# Patient Record
Sex: MALE | Race: WHITE | NOT HISPANIC OR LATINO | Employment: FULL TIME | URBAN - METROPOLITAN AREA
[De-identification: names, ages, dates, MRNs, and addresses within clinical notes are randomized per-mention and may not be internally consistent; named-entity substitution may affect disease eponyms.]

---

## 2021-11-22 ENCOUNTER — APPOINTMENT (OUTPATIENT)
Dept: URGENT CARE | Age: 56
End: 2021-11-22
Payer: OTHER MISCELLANEOUS

## 2021-11-22 PROCEDURE — G0382 LEV 3 HOSP TYPE B ED VISIT: HCPCS | Performed by: FAMILY MEDICINE

## 2021-11-22 PROCEDURE — 99283 EMERGENCY DEPT VISIT LOW MDM: CPT | Performed by: FAMILY MEDICINE

## 2021-11-24 ENCOUNTER — TELEPHONE (OUTPATIENT)
Dept: OTHER | Facility: OTHER | Age: 56
End: 2021-11-24

## 2021-11-24 ENCOUNTER — APPOINTMENT (EMERGENCY)
Dept: RADIOLOGY | Facility: HOSPITAL | Age: 56
End: 2021-11-24
Payer: OTHER MISCELLANEOUS

## 2021-11-24 ENCOUNTER — HOSPITAL ENCOUNTER (EMERGENCY)
Facility: HOSPITAL | Age: 56
Discharge: HOME | End: 2021-11-24
Attending: EMERGENCY MEDICINE | Admitting: EMERGENCY MEDICINE
Payer: OTHER MISCELLANEOUS

## 2021-11-24 VITALS
TEMPERATURE: 97.8 F | HEART RATE: 58 BPM | RESPIRATION RATE: 18 BRPM | DIASTOLIC BLOOD PRESSURE: 85 MMHG | WEIGHT: 162.48 LBS | SYSTOLIC BLOOD PRESSURE: 129 MMHG | OXYGEN SATURATION: 95 %

## 2021-11-24 DIAGNOSIS — M79.605 ACUTE PAIN OF LEFT LOWER EXTREMITY: Primary | ICD-10-CM

## 2021-11-24 PROCEDURE — 99283 EMERGENCY DEPT VISIT LOW MDM: CPT

## 2021-11-24 PROCEDURE — 72100 X-RAY EXAM L-S SPINE 2/3 VWS: CPT

## 2021-11-24 PROCEDURE — 73502 X-RAY EXAM HIP UNI 2-3 VIEWS: CPT

## 2021-11-24 PROCEDURE — 96372 THER/PROPH/DIAG INJ SC/IM: CPT

## 2021-11-24 PROCEDURE — 99284 EMERGENCY DEPT VISIT MOD MDM: CPT | Performed by: PHYSICIAN ASSISTANT

## 2021-11-24 RX ORDER — ACETAMINOPHEN 325 MG/1
975 TABLET ORAL ONCE
Status: COMPLETED | OUTPATIENT
Start: 2021-11-24 | End: 2021-11-24

## 2021-11-24 RX ORDER — KETOROLAC TROMETHAMINE 30 MG/ML
15 INJECTION, SOLUTION INTRAMUSCULAR; INTRAVENOUS ONCE
Status: COMPLETED | OUTPATIENT
Start: 2021-11-24 | End: 2021-11-24

## 2021-11-24 RX ORDER — NAPROXEN 500 MG/1
500 TABLET ORAL 2 TIMES DAILY PRN
Qty: 30 TABLET | Refills: 0 | Status: SHIPPED | OUTPATIENT
Start: 2021-11-24

## 2021-11-24 RX ORDER — METHOCARBAMOL 750 MG/1
750 TABLET, FILM COATED ORAL 2 TIMES DAILY PRN
Qty: 20 TABLET | Refills: 0 | Status: SHIPPED | OUTPATIENT
Start: 2021-11-24 | End: 2022-06-24 | Stop reason: SDUPTHER

## 2021-11-24 RX ADMIN — KETOROLAC TROMETHAMINE 15 MG: 30 INJECTION, SOLUTION INTRAMUSCULAR; INTRAVENOUS at 10:26

## 2021-11-24 RX ADMIN — ACETAMINOPHEN 975 MG: 325 TABLET, FILM COATED ORAL at 10:26

## 2021-11-29 ENCOUNTER — APPOINTMENT (OUTPATIENT)
Dept: URGENT CARE | Age: 56
End: 2021-11-29
Payer: OTHER MISCELLANEOUS

## 2021-11-29 PROCEDURE — 99213 OFFICE O/P EST LOW 20 MIN: CPT | Performed by: FAMILY MEDICINE

## 2021-12-13 ENCOUNTER — APPOINTMENT (OUTPATIENT)
Dept: URGENT CARE | Age: 56
End: 2021-12-13
Payer: OTHER MISCELLANEOUS

## 2021-12-13 PROCEDURE — 99213 OFFICE O/P EST LOW 20 MIN: CPT | Performed by: FAMILY MEDICINE

## 2021-12-30 ENCOUNTER — APPOINTMENT (OUTPATIENT)
Dept: URGENT CARE | Age: 56
End: 2021-12-30

## 2022-01-20 ENCOUNTER — APPOINTMENT (OUTPATIENT)
Dept: URGENT CARE | Age: 57
End: 2022-01-20
Payer: OTHER MISCELLANEOUS

## 2022-01-20 PROCEDURE — 99213 OFFICE O/P EST LOW 20 MIN: CPT

## 2022-01-21 ENCOUNTER — TELEPHONE (OUTPATIENT)
Dept: PAIN MEDICINE | Facility: MEDICAL CENTER | Age: 57
End: 2022-01-21

## 2022-02-03 ENCOUNTER — APPOINTMENT (OUTPATIENT)
Dept: URGENT CARE | Age: 57
End: 2022-02-03
Payer: OTHER MISCELLANEOUS

## 2022-02-03 PROCEDURE — 99213 OFFICE O/P EST LOW 20 MIN: CPT | Performed by: FAMILY MEDICINE

## 2022-02-24 ENCOUNTER — APPOINTMENT (OUTPATIENT)
Dept: URGENT CARE | Age: 57
End: 2022-02-24
Payer: OTHER MISCELLANEOUS

## 2022-02-24 PROCEDURE — 99213 OFFICE O/P EST LOW 20 MIN: CPT | Performed by: FAMILY MEDICINE

## 2022-03-03 ENCOUNTER — APPOINTMENT (OUTPATIENT)
Dept: URGENT CARE | Age: 57
End: 2022-03-03
Payer: OTHER MISCELLANEOUS

## 2022-03-03 PROCEDURE — 99213 OFFICE O/P EST LOW 20 MIN: CPT | Performed by: FAMILY MEDICINE

## 2022-03-16 ENCOUNTER — TELEPHONE (OUTPATIENT)
Dept: PAIN MEDICINE | Facility: MEDICAL CENTER | Age: 57
End: 2022-03-16

## 2022-03-16 NOTE — TELEPHONE ENCOUNTER
Reached out to the patient about rescheduling his appointment he missed on 2/28 with Dr Courtney Somers  He stated at this time he does not wish to reschedule

## 2022-06-08 ENCOUNTER — APPOINTMENT (EMERGENCY)
Dept: RADIOLOGY | Facility: HOSPITAL | Age: 57
End: 2022-06-08

## 2022-06-08 ENCOUNTER — HOSPITAL ENCOUNTER (EMERGENCY)
Facility: HOSPITAL | Age: 57
Discharge: HOME | End: 2022-06-08
Attending: EMERGENCY MEDICINE
Payer: COMMERCIAL

## 2022-06-08 VITALS
HEART RATE: 62 BPM | RESPIRATION RATE: 18 BRPM | SYSTOLIC BLOOD PRESSURE: 157 MMHG | OXYGEN SATURATION: 99 % | TEMPERATURE: 97.5 F | WEIGHT: 170.86 LBS | DIASTOLIC BLOOD PRESSURE: 94 MMHG
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
## 2022-06-08 DIAGNOSIS — S22.39XA RIB FRACTURE: ICD-10-CM

## 2022-06-08 PROCEDURE — 71101 X-RAY EXAM UNILAT RIBS/CHEST: CPT

## 2022-06-08 PROCEDURE — 99283 EMERGENCY DEPT VISIT LOW MDM: CPT

## 2022-06-08 PROCEDURE — 99284 EMERGENCY DEPT VISIT MOD MDM: CPT | Performed by: PHYSICIAN ASSISTANT

## 2022-06-08 RX ORDER — METHOCARBAMOL 500 MG/1
500 TABLET, FILM COATED ORAL ONCE
Status: COMPLETED | OUTPATIENT
Start: 2022-06-08 | End: 2022-06-08

## 2022-06-08 RX ORDER — OXYCODONE HYDROCHLORIDE 5 MG/1
5 TABLET ORAL ONCE
Status: COMPLETED | OUTPATIENT
Start: 2022-06-08 | End: 2022-06-08

## 2022-06-08 RX ORDER — OXYCODONE HYDROCHLORIDE AND ACETAMINOPHEN 5; 325 MG/1; MG/1
1 TABLET ORAL EVERY 6 HOURS PRN
Qty: 20 TABLET | Refills: 0 | Status: SHIPPED | OUTPATIENT
Start: 2022-06-08 | End: 2022-06-13

## 2022-06-08 RX ORDER — METHOCARBAMOL 500 MG/1
500 TABLET, FILM COATED ORAL 2 TIMES DAILY
Qty: 20 TABLET | Refills: 0 | Status: SHIPPED | OUTPATIENT
Start: 2022-06-08

## 2022-06-08 RX ORDER — LIDOCAINE 50 MG/G
1 PATCH TOPICAL ONCE
Status: DISCONTINUED | OUTPATIENT
Start: 2022-06-08 | End: 2022-06-08 | Stop reason: HOSPADM

## 2022-06-08 RX ADMIN — OXYCODONE HYDROCHLORIDE 5 MG: 5 TABLET ORAL at 19:47

## 2022-06-08 RX ADMIN — LIDOCAINE 1 PATCH: 50 PATCH TOPICAL at 19:47

## 2022-06-08 RX ADMIN — METHOCARBAMOL 500 MG: 500 TABLET ORAL at 19:47

## 2022-06-08 NOTE — ED PROVIDER NOTES
History  Chief Complaint   Patient presents with    Fall     Patient fell on cellar steps and landed on L side  States, "I think I broke a few ribs"  States possibly hitting head  Denies blood thinners  Denies neck pain  Edna Andres is a 62 y o   male with no significant past medical history who presents to the emergency department with left-sided chest wall pain after fall  The patient states on Monday evening he was ambulating down his basement steps when he slipped causing him to fall onto his left side  He states he states he fell down approximately 4 steps  He states that he landed primarily with his left side of his chest wall  He denies any head strike, loss consciousness neck or back pain  He denies any tongue lac or incontinence  No blood thinner use  Fall was unwitnessed  He denies any prodromal symptoms including dizziness, lightheadedness, chest pain shortness a breath, tunnel vision, GI symptoms  He states since the fall he has had some left-sided chest wall pain  He states that worsens with the rest of the left arm as well as with any sitting up leaning back  Denies any abdominal pain, nausea, vomiting, diarrhea  No urinary complaints  He denies any weakness, numbness, tingling  He states he did take some Tylenol as well as Motrin earlier in the day with minimal relief  The patient denies any fevers, chills, dizziness, headaches, chest pain, shortness of breath, palpitations, abdominal pain, nausea, vomiting, diarrhea, lower extremity pain/swelling/edema  History provided by:  Patient   used: No    Fall  Mechanism of injury: fall    Injury location:  Torso  Torso injury location:  L chest  Incident location:  Home  Time since incident:  3 days  Arrived directly from scene: no    Fall:     Fall occurred:  Down stairs    Height of fall:  4 stairs     Impact surface:  Hard floor    Point of impact: torso      Entrapped after fall: no    Suspicion of alcohol use: no    Suspicion of drug use: no    Tetanus status:  Up to date  Prior to arrival data:     Bystander interventions:  None  Associated symptoms: no abdominal pain, no back pain, no blindness, no chest pain, no difficulty breathing, no headaches, no hearing loss, no loss of consciousness, no nausea, no neck pain, no seizures and no vomiting    Risk factors: no AICD, no anticoagulation therapy, no asthma, no beta blocker therapy, no CABG, no COPD, no dialysis, no kidney disease and no past MI        Prior to Admission Medications   Prescriptions Last Dose Informant Patient Reported? Taking? methocarbamol (ROBAXIN) 750 mg tablet   No No   Sig: Take 1 tablet (750 mg total) by mouth 2 (two) times a day as needed for muscle spasms   naproxen (NAPROSYN) 500 mg tablet   No No   Sig: Take 1 tablet (500 mg total) by mouth 2 (two) times a day as needed for mild pain or moderate pain      Facility-Administered Medications: None       History reviewed  No pertinent past medical history  History reviewed  No pertinent surgical history  History reviewed  No pertinent family history  I have reviewed and agree with the history as documented  E-Cigarette/Vaping     E-Cigarette/Vaping Substances     Social History     Tobacco Use    Smoking status: Current Every Day Smoker     Packs/day: 0 25    Smokeless tobacco: Never Used   Substance Use Topics    Alcohol use: Not Currently    Drug use: Not Currently       Review of Systems   Constitutional: Positive for activity change  Negative for chills, diaphoresis, fatigue and fever  HENT: Negative for congestion, hearing loss, tinnitus and voice change  Eyes: Negative for blindness  Respiratory: Negative for apnea, chest tightness, shortness of breath, wheezing and stridor  Cardiovascular: Negative for chest pain, palpitations and leg swelling     Gastrointestinal: Negative for abdominal distention, abdominal pain, anal bleeding, blood in stool, constipation, nausea and vomiting  Genitourinary: Negative for dysuria, frequency and urgency  Musculoskeletal: Negative for arthralgias, back pain, joint swelling and neck pain  Skin: Negative for color change, pallor, rash and wound  Neurological: Negative for dizziness, tremors, seizures, loss of consciousness, syncope, weakness, numbness and headaches  All other systems reviewed and are negative  Physical Exam  Physical Exam  Vitals and nursing note reviewed  Constitutional:       General: He is not in acute distress  Appearance: Normal appearance  He is normal weight  He is not ill-appearing or toxic-appearing  HENT:      Head: Normocephalic and atraumatic  Comments: No hematomas or contusions to the skull, face, back of the head  Right Ear: Tympanic membrane, ear canal and external ear normal       Left Ear: Tympanic membrane, ear canal and external ear normal       Ears:      Comments: No hemotympanum     Nose: Nose normal  No congestion or rhinorrhea  Comments: No septal hematoma     Mouth/Throat:      Mouth: Mucous membranes are moist       Pharynx: Oropharynx is clear  Comments: No blood in the oropharynx  Eyes:      Extraocular Movements: Extraocular movements intact  Pupils: Pupils are equal, round, and reactive to light  Neck:      Comments: No midline tenderness step-offs or deformities  Cardiovascular:      Rate and Rhythm: Normal rate and regular rhythm  Pulses: Normal pulses  Heart sounds: Normal heart sounds  No murmur heard  No friction rub  No gallop  Pulmonary:      Effort: Pulmonary effort is normal       Breath sounds: Normal breath sounds  Chest:      Chest wall: Tenderness present  Abdominal:      General: Abdomen is flat  There is no distension  Palpations: Abdomen is soft  There is no mass  Tenderness: There is no abdominal tenderness  Hernia: No hernia is present     Musculoskeletal:         General: Normal range of motion  Cervical back: Normal range of motion and neck supple  No tenderness  Back:       Comments: Bilateral upper extremities:  No obvious deformity/abrasions/lacerations  2+ radial Pulse/ Cap Refill <2 seconds  Shoulder: NTTP, Strength 5/5- FROM including ER/IR/Abduction/Adduction/Flexion/Extension  Elbow: NTTP, Strength 5/5- FROM including Flexion/Extension/ Pronation/supination  Wrist: NTTP, Strength 5/5- FROM including Flexion/Extension/Ulnar Deviation/Radial Deviation  Hand: NTTP: Strength 5/5- FROM at all fingers including flexion, extension, abduction, adduction, and opposition of the thumb  FDS/FDP tendons intact by exam, Extensor tendons intact by exam    Radial/Ulnar/ Median/ and Axillary sensation intact  Bilateral lower extremities:  No obvious deformity/abrasions/lacerations  2+ femoral/DP/PT/ Cap Refill <2 seconds  Hip: NTTP, Strength 5/5- FROM including Abduction/Adduction/Flexion/Extension  Knee: NTTP, Strength 5/5- FROM including Flexion/Extension- no apparent laxity  Ankle: NTTP, Strength 5/5- FROM including Flexion/Extension/Inversion/Eversion  Foot : NTTP: Strength 5/5- FROM at all toes including flexion, extension, abduction, adduction  Sensation intact over all joints and foot  Skin:     General: Skin is warm  Capillary Refill: Capillary refill takes less than 2 seconds  Neurological:      General: No focal deficit present  Mental Status: He is alert and oriented to person, place, and time  Mental status is at baseline  Comments: GCS 15  CN 2-12 intact  PERRLA  Bilateral upper and lower extremities have 5/5 strength and sensation is intact  Finger to Nose and Heel to shin are intact   Speech normal   Gait intact           Vital Signs  ED Triage Vitals   Temperature Pulse Respirations Blood Pressure SpO2   06/08/22 1846 06/08/22 1845 06/08/22 1845 06/08/22 1845 06/08/22 1845   97 5 °F (36 4 °C) 62 18 157/94 99 %      Temp Source Heart Rate Source Patient Position - Orthostatic VS BP Location FiO2 (%)   06/08/22 1846 -- 06/08/22 1845 06/08/22 1845 --   Oral  Sitting Right arm       Pain Score       06/08/22 1845       8           Vitals:    06/08/22 1845   BP: 157/94   Pulse: 62   Patient Position - Orthostatic VS: Sitting         Visual Acuity      ED Medications  Medications   oxyCODONE (ROXICODONE) IR tablet 5 mg (5 mg Oral Given 6/8/22 1947)   methocarbamol (ROBAXIN) tablet 500 mg (500 mg Oral Given 6/8/22 1947)       Diagnostic Studies  Results Reviewed     None                 XR ribs with pa chest min 3 views LEFT   ED Interpretation by Josh Kingsley PA-C (06/08 2009)   ? 4th rib fracture, otherwise no Subq air  No PHX  Procedures  Procedures         ED Course                                             MDM  Number of Diagnoses or Management Options  Fall, initial encounter: new and requires workup  Rib fracture: new and requires workup  Diagnosis management comments: Patient was seen and examined  in the emergency department for chief complaint of left-sided chest wall pain  The patient presented after fall down approximately 4 stairs  No loss conscious no head neck or back pain  Neuro exam is nonfocal and patient denies any numbness weakness or paresthesias  Lungs are clear  Abdomen soft nontender nondistended  No bruising to the chest wall thorax or abdomen  Bilateral upper and lower extremities are neurovascular intact without evidence of trauma  Patient has tenderness palpation left side chest wall axillary line and posterior as well as anteriorly         Initial differential includes but is not limited to:  Sprain, strain, fracture, contusion, rib fracture  At this point do not suspect intracranial injury:  Patient able to be cleared via [address] rules:  1 ) GCS 15 within 2 hours of injury  2 ) There is no open or depressed skull fracture on physical exam   3 ) No signs of basilar skull fracture    4 ) There was not more than 1 episode of vomiting   5 ) There is no retrograde amnesia to greater than 30 minutes prior to the event  6 ) No dangerous mechanism (fall greater than 3 feet or struck as pedetrian)  7 ) Patient is less than 72years old and older than 16   8 ) Patient is not on anticoagulants  Per Nexus Criteria, patient does not criteria for imaging of c-spine  Patient does not:  Have a focal neurologic deficit  Midline c-spine tenderness  Altered LOC  Meet clinical criteria for intoxication or admit to ETOH use  Have a distracting injury  Workup: Will evaluate for fracture on x-ray  Will treat clinically is rib fracture  Oxycodone, Robaxin, lidocaine patch  Incentive spirometry given  Instructions given  Disposition:  General impression 80-year-old male with left-sided rib fracture  Treat symptomatically  Follow-up PCP,  strict return precautions  Incentive spirometry instructions given  We discussed the goal of short term duration of narcotic therapy, given abuse and addiction potential   Patient wishes to proceed with script for narcotic   Patient was screened to identify increased risks of harm  Viri Durant is no history of substance use disorder, concurrent use of benzodiazepines, or comorbidities that increased risk for respiratory compromise   A query of the PDMP was performed, finding no concomitant benzodiazepine prescriptions or prior opioid prescriptions   Opioid safety measures were discussed  The patient was discharged in stable condition  Patient ambulated off the department  Extensive return to emergency department precautions were discussed  Follow up with appropriate providers including primary care physician was discussed  Patient and/or their  primary decision maker expressed understanding  Patient remained stable during entire emergency department stay           Amount and/or Complexity of Data Reviewed  Tests in the radiology section of CPT®: ordered and reviewed  Review and summarize past medical records: yes  Independent visualization of images, tracings, or specimens: yes    Risk of Complications, Morbidity, and/or Mortality  Presenting problems: moderate  Diagnostic procedures: moderate  Management options: moderate    Patient Progress  Patient progress: stable      Disposition  Final diagnoses:   Fall, initial encounter   Rib fracture     Time reflects when diagnosis was documented in both MDM as applicable and the Disposition within this note     Time User Action Codes Description Comment    6/8/2022  8:14 PM Irl Ogles Add [R93  KCYV] Fall, initial encounter     6/8/2022  8:14 PM Irl Ogles Add [S22 39XA] Rib fracture       ED Disposition     ED Disposition   Discharge    Condition   Stable    Date/Time   Wed Jun 8, 2022  8:18 PM    Comment   Carlie Judd discharge to home/self care  Follow-up Information     Follow up With Specialties Details Why [address] [facility] Emergency Department Emergency Medicine Go to  As needed, If symptoms worsen [place] [id number]  [address] [facility] Emergency Department, [address] [place] [address] 200  Call  To schedule an appointment with a primary care physician [phone]             Discharge Medication List as of 6/8/2022  8:18 PM      START taking these medications    Details   !! methocarbamol (ROBAXIN) 500 mg tablet Take 1 tablet (500 mg total) by mouth 2 (two) times a day, Starting Wed 6/8/2022, Normal      oxyCODONE-acetaminophen (Percocet) 5-325 mg per tablet Take 1 tablet by mouth every 6 (six) hours as needed for moderate pain for up to 5 days Max Daily Amount: 4 tablets, Starting Wed 6/8/2022, Until Mon 6/13/2022 at 2359, Normal       !! - Potential duplicate medications found  Please discuss with provider        CONTINUE these medications which have NOT CHANGED Details   !! methocarbamol (ROBAXIN) 750 mg tablet Take 1 tablet (750 mg total) by mouth 2 (two) times a day as needed for muscle spasms, Starting Wed 11/24/2021, Normal      naproxen (NAPROSYN) 500 mg tablet Take 1 tablet (500 mg total) by mouth 2 (two) times a day as needed for mild pain or moderate pain, Starting Wed 11/24/2021, Normal       !! - Potential duplicate medications found  Please discuss with provider  No discharge procedures on file      PDMP Review       Value Time User    PDMP Reviewed  Yes 6/8/2022  8:15 PM Yessy Acevedo PA-C          ED Provider  Electronically Signed by           Yessy Acevedo PA-C  06/09/22 [id number]

## 2022-06-08 NOTE — Clinical Note
Junior Knight was seen and treated in our emergency department on 6/8/2022  Limited lifting capacity upper extremities    Diagnosis: Rib fracture    Gely Hua  may return to work on return date  He may return on this date: 06/13/2022    May return Monday, limited lifting capacity secondary to rib fracture  May return symptomatically feeling better/cleared by primary care  If you have any questions or concerns, please don't hesitate to call        Ryan Dover PA-C    ______________________________           _______________          _______________  Hospital Representative                              Date                                Time

## 2022-06-09 NOTE — DISCHARGE INSTRUCTIONS
You were seen in the emergency department today for left-sided chest wall pain after fall  Radiologic imaging revealed left-sided rib fracture  Please follow-up with your primary care physician regarding your symptoms  Return sooner to the Emergency Department if increased pain, chest pain, shortness a breath, cough, congestion, fevers, chills, difficulty breathing, swelling, numbness, weakness, fever, redness, vomiting  Lidocaine patch every 12 hours  Robaxin and Percocet as needed for pain  Do not drive or operate machinery with percocet  Do not use acetaminophen with percocet  Incentive spirometry 10 times an hour while awake

## 2022-06-24 ENCOUNTER — OFFICE VISIT (OUTPATIENT)
Dept: FAMILY MEDICINE CLINIC | Facility: CLINIC | Age: 57
End: 2022-06-24
Payer: COMMERCIAL

## 2022-06-24 VITALS
DIASTOLIC BLOOD PRESSURE: 90 MMHG | OXYGEN SATURATION: 99 % | SYSTOLIC BLOOD PRESSURE: 122 MMHG | RESPIRATION RATE: 14 BRPM | HEIGHT: 67 IN | HEART RATE: 63 BPM | TEMPERATURE: 98.5 F | BODY MASS INDEX: 26.9 KG/M2 | WEIGHT: 171.4 LBS

## 2022-06-24 DIAGNOSIS — Z00.00 HEALTHCARE MAINTENANCE: Primary | ICD-10-CM

## 2022-06-24 DIAGNOSIS — Z12.5 PROSTATE CANCER SCREENING: ICD-10-CM

## 2022-06-24 DIAGNOSIS — R91.1 LESION OF LUNG: ICD-10-CM

## 2022-06-24 DIAGNOSIS — Z12.11 COLON CANCER SCREENING: ICD-10-CM

## 2022-06-24 DIAGNOSIS — R07.81 RIB PAIN ON LEFT SIDE: ICD-10-CM

## 2022-06-24 DIAGNOSIS — R20.2 LEFT HAND PARESTHESIA: ICD-10-CM

## 2022-06-24 PROCEDURE — 3008F BODY MASS INDEX DOCD: CPT | Performed by: NURSE PRACTITIONER

## 2022-06-24 PROCEDURE — 99386 PREV VISIT NEW AGE 40-64: CPT | Performed by: NURSE PRACTITIONER

## 2022-06-24 PROCEDURE — 3725F SCREEN DEPRESSION PERFORMED: CPT | Performed by: NURSE PRACTITIONER

## 2022-06-24 RX ORDER — METHOCARBAMOL 750 MG/1
750 TABLET, FILM COATED ORAL 2 TIMES DAILY PRN
Qty: 20 TABLET | Refills: 0 | Status: SHIPPED | OUTPATIENT
Start: 2022-06-24

## 2022-06-24 RX ORDER — OMEPRAZOLE 20 MG/1
20 TABLET, DELAYED RELEASE ORAL DAILY
COMMUNITY

## 2022-06-24 RX ORDER — PREDNISONE 50 MG/1
50 TABLET ORAL DAILY
Qty: 5 TABLET | Refills: 0 | Status: SHIPPED | OUTPATIENT
Start: 2022-06-24 | End: 2022-06-29

## 2022-06-24 NOTE — ASSESSMENT & PLAN NOTE
- s/p fall with rib fractures  - Continue Naproxen twice daily  - Refill sent for Robaxin as needed  - Will continue to follow up

## 2022-06-24 NOTE — ASSESSMENT & PLAN NOTE
- Discussed immunizations, screenings, healthy diet, exercise, and safety measures  - Discussed importance of regular dental and vision exams    - Referral placed for screening colonoscopy  - Routine blood work ordered  Will contact patient with results

## 2022-06-24 NOTE — ASSESSMENT & PLAN NOTE
- Possibly r/t to inflammation caused by overuse  Not typical presentation for carpal tunnel but will prescribe prednisone for 5 days  Advised of side effects   - Will continue to follow up

## 2022-06-24 NOTE — ASSESSMENT & PLAN NOTE
- Chest x-ray performed in ER showed incidentally noted 3-4 cm pleural-based lesion  Recommended further evaluation with chest CT  CT ordered  - Will continue to follow up

## 2022-06-24 NOTE — PROGRESS NOTES
Assessment/Plan:    Lesion of lung  - Chest x-ray performed in ER showed incidentally noted 3-4 cm pleural-based lesion  Recommended further evaluation with chest CT  CT ordered  - Will continue to follow up  Rib pain on left side  - s/p fall with rib fractures  - Continue Naproxen twice daily  - Refill sent for Robaxin as needed  - Will continue to follow up  Left hand paresthesia  - Possibly r/t to inflammation caused by overuse  Not typical presentation for carpal tunnel but will prescribe prednisone for 5 days  Advised of side effects   - Will continue to follow up  Healthcare maintenance  - Discussed immunizations, screenings, healthy diet, exercise, and safety measures  - Discussed importance of regular dental and vision exams    - Referral placed for screening colonoscopy  - Routine blood work ordered  Will contact patient with results  Diagnoses and all orders for this visit:    Healthcare maintenance  -     CBC and differential; Future  -     Comprehensive metabolic panel; Future  -     Lipid Panel with Direct LDL reflex; Future  -     TSH, 3rd generation with Free T4 reflex; Future    Left hand paresthesia  -     predniSONE 50 mg tablet; Take 1 tablet (50 mg total) by mouth daily for 5 days    Rib pain on left side  -     methocarbamol (ROBAXIN) 750 mg tablet; Take 1 tablet (750 mg total) by mouth 2 (two) times a day as needed for muscle spasms    Lesion of lung  -     CT chest wo contrast; Future    Colon cancer screening  -     Ambulatory Referral to General Surgery; Future    Prostate cancer screening  -     PSA, Total Screen; Future    Other orders  -     omeprazole (PriLOSEC OTC) 20 MG tablet; Take 20 mg by mouth daily        Subjective:      Patient ID: Miky Wren is a 62 y o  male  Patient presents today to establish care  He has no reported past medical history and takes no prescription medications on a daily basis   He was recently seen in the ER on 6/8 with chest wall pain s/p fall  He did have multiple left sided rib fractures  His x-ray did also note a possible pleural-based lesion which they recommended follow up CT  Patient is complaining today of numbness and tingling in his left ring and pinky finger  He also states that he feels like these fingers get cold  This started to happen after his injury  He denies any hand or wrist pain  He does do repetitive movements at work with [employer]  The following portions of the patient's history were reviewed and updated as appropriate: allergies, current medications, past family history, past medical history, past social history, past surgical history and problem list     Review of Systems   Constitutional: Negative for fatigue and fever  HENT: Positive for rhinorrhea  Negative for congestion and trouble swallowing  Eyes: Negative for visual disturbance  Respiratory: Negative for cough and shortness of breath  Cardiovascular: Negative for chest pain and palpitations  Gastrointestinal: Negative for abdominal pain and blood in stool  Endocrine: Negative for cold intolerance and heat intolerance  Genitourinary: Negative for difficulty urinating, dysuria and frequency  Musculoskeletal: Negative for gait problem and neck pain  Left sided rib pain - s/p fall with rib fracture   Skin: Negative for rash  Neurological: Positive for numbness (numbness and tingling in 4th and 5th digits of left hand)  Negative for dizziness, syncope and headaches  Hematological: Negative for adenopathy  Psychiatric/Behavioral: Negative for behavioral problems  Objective:      /90 (BP Location: Left arm, Patient Position: Sitting, Cuff Size: Adult)   Pulse 63   Temp 98 5 °F (36 9 °C) (Tympanic)   Resp 14   Ht 5' 7" (1 702 m)   Wt 77 7 kg (171 lb 6 4 oz)   SpO2 99%   BMI 26 85 kg/m²          Physical Exam  Vitals and nursing note reviewed  Constitutional:       General: He is not in acute distress  Appearance: Normal appearance  He is not ill-appearing  HENT:      Head: Normocephalic and atraumatic  Right Ear: Tympanic membrane, ear canal and external ear normal       Left Ear: Tympanic membrane, ear canal and external ear normal       Nose: No congestion  Eyes:      Extraocular Movements: Extraocular movements intact  Conjunctiva/sclera: Conjunctivae normal       Pupils: Pupils are equal, round, and reactive to light  Cardiovascular:      Rate and Rhythm: Normal rate and regular rhythm  Heart sounds: Normal heart sounds  Pulmonary:      Effort: Pulmonary effort is normal       Breath sounds: Normal breath sounds  Abdominal:      General: Bowel sounds are normal       Palpations: Abdomen is soft  Musculoskeletal:         General: Normal range of motion  Left hand: Normal range of motion  Normal strength  Decreased sensation of the ulnar distribution  Normal capillary refill  Normal pulse  Cervical back: Normal range of motion  Right lower leg: No edema  Left lower leg: No edema  Lymphadenopathy:      Cervical: No cervical adenopathy  Skin:     General: Skin is warm and dry  Neurological:      Mental Status: He is alert and oriented to person, place, and time  Cranial Nerves: No cranial nerve deficit     Psychiatric:         Mood and Affect: Mood normal          Behavior: Behavior normal

## 2022-06-30 ENCOUNTER — HOSPITAL ENCOUNTER (OUTPATIENT)
Dept: RADIOLOGY | Facility: HOSPITAL | Age: 57
Discharge: HOME | End: 2022-06-30
Payer: COMMERCIAL

## 2022-06-30 DIAGNOSIS — R91.1 LESION OF LUNG: ICD-10-CM

## 2022-06-30 PROCEDURE — G1004 CDSM NDSC: HCPCS

## 2022-06-30 PROCEDURE — 71250 CT THORAX DX C-: CPT

## 2022-07-05 ENCOUNTER — TELEPHONE (OUTPATIENT)
Dept: FAMILY MEDICINE CLINIC | Facility: CLINIC | Age: 57
End: 2022-07-05

## 2022-07-05 NOTE — TELEPHONE ENCOUNTER
----- Message from [id number] sent at 7/5/2022  7:02 AM EDT -----  CT of the chest showed healing rib fractures  No other concerning findings noted and no additional follow up is needed

## 2022-07-24 ENCOUNTER — TELEPHONE (OUTPATIENT)
Dept: OTHER | Facility: OTHER | Age: 57
End: 2022-07-24

## 2022-10-11 PROBLEM — Z00.00 HEALTHCARE MAINTENANCE: Status: RESOLVED | Noted: 2022-06-24 | Resolved: 2022-10-11

## 2023-08-28 ENCOUNTER — TELEPHONE (OUTPATIENT)
Dept: OBGYN CLINIC | Facility: CLINIC | Age: 58
End: 2023-08-28

## 2023-08-29 ENCOUNTER — TELEPHONE (OUTPATIENT)
Dept: NEUROLOGY | Facility: CLINIC | Age: 58
End: 2023-08-29

## 2023-09-15 ENCOUNTER — TELEPHONE (OUTPATIENT)
Dept: NEUROLOGY | Facility: CLINIC | Age: 58
End: 2023-09-15

## 2023-09-15 NOTE — TELEPHONE ENCOUNTER
Left voicemail offering earlier EMG appt. If patient returns call please offer 9/18 @11:15am if still available.

## 2023-09-18 ENCOUNTER — TELEPHONE (OUTPATIENT)
Dept: NEUROLOGY | Facility: CLINIC | Age: 58
End: 2023-09-18

## 2023-09-19 ENCOUNTER — TELEPHONE (OUTPATIENT)
Dept: OBGYN CLINIC | Facility: CLINIC | Age: 58
End: 2023-09-19

## 2023-09-24 ENCOUNTER — TELEPHONE (OUTPATIENT)
Dept: OTHER | Facility: OTHER | Age: 58
End: 2023-09-24

## 2023-09-25 NOTE — TELEPHONE ENCOUNTER
Lm for patient informing him that the follow up can be rescheduled once patient has rescheduled his emg as that is what his follow up is for, please help patient r/s his emg and appt following that if pt calls back.

## 2023-12-07 ENCOUNTER — PROCEDURE VISIT (OUTPATIENT)
Dept: NEUROLOGY | Facility: CLINIC | Age: 58
End: 2023-12-07
Payer: COMMERCIAL

## 2023-12-07 DIAGNOSIS — R20.2 NUMBNESS AND TINGLING IN LEFT HAND: ICD-10-CM

## 2023-12-07 DIAGNOSIS — R20.0 NUMBNESS AND TINGLING IN LEFT HAND: ICD-10-CM

## 2023-12-07 PROCEDURE — 95908 NRV CNDJ TST 3-4 STUDIES: CPT | Performed by: PSYCHIATRY & NEUROLOGY

## 2023-12-07 PROCEDURE — 95886 MUSC TEST DONE W/N TEST COMP: CPT | Performed by: PSYCHIATRY & NEUROLOGY

## 2023-12-07 NOTE — PROGRESS NOTES
EMG 1 Limb     Date/Time  12/7/2023 9:20 AM     Performed by  Maxime Mejia MD   Authorized by  Bao Ferguson MD         EMG LEFT UPPER EXTREMITY    Patient reports symptoms of numbness and tingling predominately in the medial 2 fingers of the left hand    Motor and sensory conduction studies were performed on the left median, ulnar as well as radial sensory nerves. The median distal motor latency was prolonged at 7.9 ms and the ulnar latency prolonged at 5.3 ms. The motor action potential amplitudes were significantly reduced. Motor conduction velocities of the median nerve was slow at 31 m/s conduction of the ulnar nerve below the elbow was normal however there was a significant delay across the elbow at 31 m/s    The left median and ulnar F waves were prolonged. Left median distal sensory latency is prolonged at 4.4 ms with a delay and palmar stimulation and reduced sensory action potential amplitudes. The ulnar distal sensory latency is also prolonged at 4 ms with reduced sensory action potential amplitudes. The radial distal sensory latency was normal with normal action potential amplitudes. Concentric needle EMG was performed in various distal and proximal muscles of the left upper extremity including APB, FDI, EDC, brachial radialis, biceps, triceps in the low cervical paraspinal region. There were positive waves and fibrillation potentials noted at APB and FDI. Rapid firing large amplitude potentials with reduced interference patterns were noted at APB and FDI as well as the triceps region. The other compound motor unit potentials were of normal configuration and interference patterns were full or full for effort    IMPRESSION: This is an abnormal EMG of the left upper extremity due to change consistent with a localized neuropathic process involving the median nerve at the wrist consistent with severe carpal tunnel syndrome with the mild and axonal change.   In addition a localized neuropathic process involving the ulnar nerve at the elbow consistent with severe cubital tunnel syndrome with mild and axonal change are also noted. More localized neuropathic process involving the ulnar nerve at the wrist cannot be excluded. Chronic denervation changes in the C7 myotomal distribution consistent with chronic cervical radiculopathy are also noted. Marimar Valdez M.D. [facility]  [address], [address] [facility]  [phone]    Electromyography & Nerve Conduction Studies Report          Full Name: Vita Hou Gender: Male  Patient ID: [id number] YOB: 1965      Visit Date: 12/7/2023 9:32 AM  Age: 62 Years  Examining Physician: DR. Artis Lua  Referring Physician: DR. Luis Alberto Medrano  Technologist: RAJ  Temperature: UNDER 32 , HEAT APPLIED  Height: 5 feet 7 inch      Sensory Nerve Conduction Study       Nerve / Sites Rec. Site Onset Lat Peak Lat  Amp Segments Distance Peak Diff Velocity     ms ms µV  cm ms m/s   L Median - Dig II (Antidromic). Wrist Index 4.4 5.9 6.4 Wrist - Index 14  32      Ref. ?3.4  ? 20.0 Ref. ?50   L Ulnar - Dig V (Antidromic). Wrist Dig V 4.0 5.3 2.3 Wrist - Dig V 12  30      Ref. ?2.9  ? 17.0 Ref. ?50   L Median, Ulnar - Palmar      Median Palm Wrist 2.5 3.3 24.4 Median Palm - Wrist 8  32      Ref. ?2.2 ? 50.0 Ref. ?50      Ulnar Palm Wrist 1.4 2.2 1.4 Ulnar Palm - Wrist 8  57      Ref. ?2.2 ? 12.0 Ref. ?50        Median Palm - Ulnar Palm  1.1         Ref. ?0.4    L Radial - Superficial (Antidromic)      Forearm Wrist 1.9 2.7 16.9 Forearm - Wrist 10  53      Ref. ?2.9 ? 15.0 Ref. ?50       Motor Nerve Conduction Study       Nerve / Sites Muscle Latency Ref. Amplitude Ref. Segments Distance Lat Diff Velocity Ref.      ms ms mV mV  cm ms m/s m/s   L Median - APB      Wrist APB 7.9 ?4.4 1.4 ?4.0 Wrist - APB 7         Elbow APB 14.9  0.8  Elbow - Wrist 22 7.02 31 ?49   L Ulnar - ADM      Wrist ADM 5.3 ?3.3 0.2 ?6.0 Wrist - ADM 7         B. Elbow ADM 9.6  0.0  B. Elbow - Wrist 21 4.27 49 ?49      A. Elbow ADM 12.9  0.0  A. Elbow - B. Elbow 10 3.25 31 ?49       F Waves       Nerve F Latency M Latency F - M Lat    ms ms ms   L Median - APB 36.5 8.3 28.2   L Ulnar - ADM 31.1         EMG Summary Table     Spontaneous MUAP Recruitment   Muscle Nerve Roots IA Fib PSW Fasc H.F. Dur. Amp PPP Config Pattern   L. First dorsal interosseous Ulnar C8-T1 NL 2+ 2+ None None NL Gr. Incr. None NL Reduced   R. Abductor pollicis brevis Median W1-T8 NL 1+ 1+ None None NL Gr. Incr. None NL Reduced   R. Brachioradialis Radial C5-C6 NL None None None None NL NL None NL NL   R. Extensor digitorum communis Radial C7-C8 NL None None None None NL NL None NL NL   R. Biceps brachii Musculocutaneous C5-C6 NL None None None None NL NL None NL NL   R. Triceps brachii Radial C6-C8 NL None None None None NL Sl. Incr.  Few NL Reduced   R. Cervical paraspinals Spinal C4-C8 NL None None None None NL NL None NL NL

## 2024-01-08 ENCOUNTER — OFFICE VISIT (OUTPATIENT)
Dept: OBGYN CLINIC | Facility: CLINIC | Age: 59
End: 2024-01-08
Payer: COMMERCIAL

## 2024-01-08 VITALS
SYSTOLIC BLOOD PRESSURE: 140 MMHG | BODY MASS INDEX: 25.74 KG/M2 | WEIGHT: 164 LBS | HEIGHT: 67 IN | DIASTOLIC BLOOD PRESSURE: 80 MMHG

## 2024-01-08 DIAGNOSIS — G56.02 CARPAL TUNNEL SYNDROME ON LEFT: Primary | ICD-10-CM

## 2024-01-08 DIAGNOSIS — G56.22 CUBITAL TUNNEL SYNDROME ON LEFT: ICD-10-CM

## 2024-01-08 DIAGNOSIS — R20.0 NUMBNESS AND TINGLING IN LEFT HAND: ICD-10-CM

## 2024-01-08 DIAGNOSIS — R20.2 NUMBNESS AND TINGLING IN LEFT HAND: ICD-10-CM

## 2024-01-08 PROCEDURE — 99214 OFFICE O/P EST MOD 30 MIN: CPT | Performed by: SURGERY

## 2024-01-08 NOTE — H&P (VIEW-ONLY)
ORTHOPAEDIC HAND, WRIST, AND ELBOW OFFICE  VISIT       ASSESSMENT/PLAN:      58 y o male who presents with Left hand numbness and intrinsic atrophy    We discussed EMG results and this correlates with his symptoms. He also has some cervical component per his EMG as well  We recommended surgery. Intraoperative and postoperative care discussed  We discussed that due to his prolonged symptoms, we will monitor his symptoms  All of the risks and benefits of operative treatment were explained to the patient, as well as the risks and benefits of any alternative treatment options, including nonoperative care. This risks of surgery include, but are not limited to, infection, bleeding, blood clot, damage to nerves/arteries, need for further surgery, cardiovascular risk, anesthesia risk, and continued pain.  The patient understood this and elects to proceed forward with surgical intervention.  Consent signed- Left Carpal and Cubital Tunnel Release.  Discussed that improvement can be variable with significant atrophy and mild clawing.  The main goal is to try to prevent progression.        The patient verbalized understanding of exam findings and treatment plan. We engaged in the shared decision-making process and treatment options were discussed at length with the patient. Surgical and conservative management discussed today along with risks and benefits.    Diagnoses and all orders for this visit:    Carpal tunnel syndrome on left  -     Case request operating room: RELEASE CUBITAL TUNNEL, LEFT, RELEASE CARPAL TUNNEL, LEFT; Standing  -     CBC and Platelet; Future  -     Basic metabolic panel; Future  -     EKG 12 lead; Future  -     Case request operating room: RELEASE CUBITAL TUNNEL, LEFT, RELEASE CARPAL TUNNEL, LEFT    Cubital tunnel syndrome on left  -     Case request operating room: RELEASE CUBITAL TUNNEL, LEFT, RELEASE CARPAL TUNNEL, LEFT; Standing  -     CBC and Platelet; Future  -     Basic metabolic panel; Future  -      EKG 12 lead; Future  -     Case request operating room: RELEASE CUBITAL TUNNEL, LEFT, RELEASE CARPAL TUNNEL, LEFT    Numbness and tingling in left hand    Other orders  -     Nursing Communication Warmimg Interventions Implemented; Standing  -     Nursing Communication CHG bath, have staff wash entire body (neck down) per pre-op bathing protocol. Routine, evening prior to, and day of surgery.; Standing  -     Void on call to OR; Standing  -     Insert peripheral IV; Standing  -     Diet NPO; Sips with meds; Standing          Follow Up:  Return for Post-Op, sutures removed.    To Do Next Visit:  Re-evaluation of current issue and Sutures out          ____________________________________________________________________________________________________________________________________________      CHIEF COMPLAINT:  Chief Complaint   Patient presents with    Left Hand - Pain       SUBJECTIVE:  Mu Gruber is a 58 y.o. year old  male who presents for follow of Left hand numbness and tingling    Patient states that he feels his symptoms have improved since last visit. He states he can now tie his shoes and pick a pencil up off a table.  He did have his EMG   He states was using Preparation H over his arms    I have personally reviewed all the relevant PMH, PSH, SH, FH, Medications and allergies      PAST MEDICAL HISTORY:  Past Medical History:   Diagnosis Date    GERD (gastroesophageal reflux disease)        PAST SURGICAL HISTORY:  History reviewed. No pertinent surgical history.    FAMILY HISTORY:  Family History   Problem Relation Age of Onset    Cancer Father        SOCIAL HISTORY:  Social History     Tobacco Use    Smoking status: Every Day     Current packs/day: 0.25     Average packs/day: 0.3 packs/day for 10.0 years (2.5 ttl pk-yrs)     Types: Cigarettes    Smokeless tobacco: Never   Vaping Use    Vaping status: Never Used   Substance Use Topics    Alcohol use: Yes     Comment: occasional    Drug use: Not Currently  "      MEDICATIONS:    Current Outpatient Medications:     omeprazole (PriLOSEC OTC) 20 MG tablet, Take 20 mg by mouth daily, Disp: , Rfl:     methocarbamol (ROBAXIN) 500 mg tablet, Take 1 tablet (500 mg total) by mouth 2 (two) times a day (Patient not taking: Reported on 6/24/2022), Disp: 20 tablet, Rfl: 0    methocarbamol (ROBAXIN) 500 mg tablet, Take 1 tablet (500 mg total) by mouth 4 (four) times a day for 7 days, Disp: 28 tablet, Rfl: 0    methocarbamol (ROBAXIN) 750 mg tablet, Take 1 tablet (750 mg total) by mouth 2 (two) times a day as needed for muscle spasms, Disp: 20 tablet, Rfl: 0    naproxen (NAPROSYN) 500 mg tablet, Take 1 tablet (500 mg total) by mouth 2 (two) times a day as needed for mild pain or moderate pain, Disp: 30 tablet, Rfl: 0    ALLERGIES:  No Known Allergies        REVIEW OF SYSTEMS:  Review of Systems   Constitutional:  Negative for chills and fever.   HENT:  Negative for ear pain and sore throat.    Eyes:  Negative for pain and visual disturbance.   Respiratory:  Negative for cough and shortness of breath.    Cardiovascular:  Negative for chest pain and palpitations.   Gastrointestinal:  Negative for abdominal pain and vomiting.   Genitourinary:  Negative for dysuria and hematuria.   Musculoskeletal:  Negative for arthralgias and back pain.   Skin:  Negative for color change and rash.   Neurological:  Positive for numbness. Negative for seizures and syncope.   All other systems reviewed and are negative.      VITALS:  Vitals:    01/08/24 1152   BP: 140/80       LABS:  HgA1c: No results found for: \"HGBA1C\"  BMP: No results found for: \"GLUCOSE\", \"CALCIUM\", \"NA\", \"K\", \"CO2\", \"CL\", \"BUN\", \"CREATININE\"    _____________________________________________________  PHYSICAL EXAMINATION:  General: well developed and well nourished, alert, oriented times 3 and appears comfortable  Psychiatric: Normal  HEENT: Normocephalic, Atraumatic Trachea Midline, No torticollis  Pulmonary: No audible wheezing or " respiratory distress   Abdomen/GI: Non tender, non distended   Cardiovascular: No pitting edema, 2+ radial pulse   Skin: No masses, erythema, lacerations, fluctation, ulcerations  Neurovascular: Sensation Intact to the Median, Ulnar, Radial Nerve, Motor Intact to the Median, Ulnar, Radial Nerve and Pulses Intact  Musculoskeletal: Normal, except as noted in detailed exam and in HPI.      MUSCULOSKELETAL EXAMINATION:  SILT  2 point discrimination Left hand  5 mm throughout   + Mild Cubital and Carpal Tunnel compression tests  Intrinsic atrophy  Ulnar digits rest in slightly clawed position, able to actively correct though  ___________________________________________________  STUDIES REVIEWED:  EMG 12/7/2023      EMG LEFT UPPER EXTREMITY      IMPRESSION: This is an abnormal EMG of the left upper extremity due to change consistent with a localized neuropathic process involving the median nerve at the wrist consistent with severe carpal tunnel syndrome with the mild and axonal change. In addition a localized neuropathic process involving the ulnar nerve at the elbow consistent with severe cubital tunnel syndrome with mild and axonal change are also noted. More localized neuropathic process involving the ulnar nerve at the wrist cannot be excluded. Chronic denervation changes in the C7 myotomal distribution consistent with chronic cervical radiculopathy are also noted.          PROCEDURES PERFORMED:  Procedures  No Procedures performed today    _____________________________________________________      Scribe Attestation      I,:  Roman Lux am acting as a scribe while in the presence of the attending physician.:       I,:  Herrera Smith MD personally performed the services described in this documentation    as scribed in my presence.:

## 2024-01-12 RX ORDER — CHOLECALCIFEROL (VITAMIN D3) 125 MCG
3000 CAPSULE ORAL AS NEEDED
COMMUNITY

## 2024-01-12 RX ORDER — DIPHENOXYLATE HYDROCHLORIDE AND ATROPINE SULFATE 2.5; .025 MG/1; MG/1
1 TABLET ORAL DAILY
COMMUNITY

## 2024-01-12 RX ORDER — LANOLIN ALCOHOL/MO/W.PET/CERES
1 CREAM (GRAM) TOPICAL 3 TIMES DAILY
COMMUNITY

## 2024-01-12 RX ORDER — IBUPROFEN 200 MG
TABLET ORAL EVERY 6 HOURS PRN
COMMUNITY

## 2024-01-12 NOTE — PRE-PROCEDURE INSTRUCTIONS
Pre-Surgery Instructions:   Medication Instructions    glucosamine-chondroitin 500-400 MG tablet Stop taking 7 days prior to surgery.    ibuprofen (MOTRIN) 200 mg tablet Stop taking 7 days prior to surgery.    lactase (LACTAID) 3,000 units tablet Hold day of surgery.    multivitamin (THERAGRAN) TABS Stop taking 7 days prior to surgery.    omeprazole (PriLOSEC OTC) 20 MG tablet Uses PRN- OK to take day of surgery   Medication instructions for day surgery reviewed. Please use only a sip of water to take your instructed medications. Avoid all over the counter vitamins, supplements and NSAIDS for one week prior to surgery per anesthesia guidelines. Tylenol is ok to take as needed.     You will receive a call one business day prior to surgery with an arrival time and hospital directions. If your surgery is scheduled on a Monday, the hospital will be calling you on the Friday prior to your surgery. If you have not heard from anyone by 8pm, please call the hospital supervisor through the hospital  at [phone]. (Manolo [phone]).    Do not eat or drink anything after midnight the night before your surgery, including candy, mints, lifesavers, or chewing gum. Do not drink alcohol 24hrs before your surgery. Try not to smoke at least 24hrs before your surgery.       Follow the pre surgery showering instructions as listed in the “My Surgical Experience Booklet” or otherwise provided by your surgeon's office. Do not use a blade to shave the surgical area 1 week before surgery. It is okay to use a clean electric clippers up to 24 hours before surgery. Do not apply any lotions, creams, including makeup, cologne, deodorant, or perfumes after showering on the day of your surgery. Do not use dry shampoo, hair spray, hair gel, or any type of hair products.     No contact lenses, eye make-up, or artificial eyelashes. Remove nail polish, including gel polish, and any artificial, gel, or acrylic nails if possible. Remove  all jewelry including rings and body piercing jewelry.     Wear causal clothing that is easy to take on and off. Consider your type of surgery.    Keep any valuables, jewelry, piercings at home. Please bring any specially ordered equipment (sling, braces) if indicated.    Arrange for a responsible person to drive you to and from the hospital on the day of your surgery. Visitor Guidelines discussed.     Call the surgeon's office with any new illnesses, exposures, or additional questions prior to surgery.    Please reference your “My Surgical Experience Booklet” for additional information to prepare for your upcoming surgery.

## 2024-01-18 ENCOUNTER — APPOINTMENT (OUTPATIENT)
Dept: LAB | Facility: HOSPITAL | Age: 59
End: 2024-01-18

## 2024-01-18 ENCOUNTER — TELEPHONE (OUTPATIENT)
Age: 59
End: 2024-01-18

## 2024-01-18 ENCOUNTER — APPOINTMENT (OUTPATIENT)
Dept: LAB | Facility: HOSPITAL | Age: 59
End: 2024-01-18
Payer: COMMERCIAL

## 2024-01-18 ENCOUNTER — TELEPHONE (OUTPATIENT)
Dept: OBGYN CLINIC | Facility: MEDICAL CENTER | Age: 59
End: 2024-01-18

## 2024-01-18 DIAGNOSIS — G56.22 CUBITAL TUNNEL SYNDROME ON LEFT: ICD-10-CM

## 2024-01-18 DIAGNOSIS — G56.02 CARPAL TUNNEL SYNDROME ON LEFT: ICD-10-CM

## 2024-01-18 DIAGNOSIS — Z01.818 PRE-OP EXAMINATION: Primary | ICD-10-CM

## 2024-01-18 LAB
ANION GAP SERPL CALCULATED.3IONS-SCNC: 3 MMOL/L
BUN SERPL-MCNC: 19 MG/DL (ref 5–25)
CALCIUM SERPL-MCNC: 9.3 MG/DL (ref 8.4–10.2)
CHLORIDE SERPL-SCNC: 107 MMOL/L (ref 96–108)
CO2 SERPL-SCNC: 29 MMOL/L (ref 21–32)
CREAT SERPL-MCNC: 0.92 MG/DL (ref 0.6–1.3)
ERYTHROCYTE [DISTWIDTH] IN BLOOD BY AUTOMATED COUNT: 12.8 % (ref 11.6–15.1)
GFR SERPL CREATININE-BSD FRML MDRD: 91 ML/MIN/1.73SQ M
GLUCOSE SERPL-MCNC: 90 MG/DL (ref 65–140)
HCT VFR BLD AUTO: 41.9 % (ref 36.5–49.3)
HGB BLD-MCNC: 14 G/DL (ref 12–17)
MCH RBC QN AUTO: 32.5 PG (ref 26.8–34.3)
MCHC RBC AUTO-ENTMCNC: 33.4 G/DL (ref 31.4–37.4)
MCV RBC AUTO: 97 FL (ref 82–98)
PLATELET # BLD AUTO: 305 THOUSANDS/UL (ref 149–390)
PMV BLD AUTO: 9.5 FL (ref 8.9–12.7)
POTASSIUM SERPL-SCNC: 4.6 MMOL/L (ref 3.5–5.3)
RBC # BLD AUTO: 4.31 MILLION/UL (ref 3.88–5.62)
SODIUM SERPL-SCNC: 139 MMOL/L (ref 135–147)
WBC # BLD AUTO: 7.27 THOUSAND/UL (ref 4.31–10.16)

## 2024-01-18 PROCEDURE — 80048 BASIC METABOLIC PNL TOTAL CA: CPT

## 2024-01-18 PROCEDURE — 85027 COMPLETE CBC AUTOMATED: CPT

## 2024-01-18 PROCEDURE — 36415 COLL VENOUS BLD VENIPUNCTURE: CPT

## 2024-01-18 NOTE — TELEPHONE ENCOUNTER
Patient called in to check if has an order from Dr. Herrera Smith given for his EKG and Chest X-Ray to be done before his Surgery on coming Tuesday. Did ask the patient to call in the Dr. Herrera Smith office to check if he needs the both to be done if yes needs to schedule the appt to get it done. He will return call back to us after checking. Thanks.

## 2024-01-18 NOTE — TELEPHONE ENCOUNTER
Patient is currently at hospital for pre-admit. He has completed his blood work but there is no chest xray order in his chart. He says they also see that the EKG was already done, but the only order in is from today. Please give patient a call back to clarify what is needed.

## 2024-01-18 NOTE — TELEPHONE ENCOUNTER
Caller:  Mu     Doctor: Dr Smith     Reason for call: The patient is calling and asking if he needs a chest xray and an EKG . He went today for labs and they did see the EKG but I do see it now. Does he need a chest xray? Surgery 1/23 Dr Smith.   Please advise the patient. Thank you.     Call back#: [phone]

## 2024-01-20 ENCOUNTER — APPOINTMENT (OUTPATIENT)
Dept: LAB | Facility: HOSPITAL | Age: 59
End: 2024-01-20

## 2024-01-20 DIAGNOSIS — Z01.818 PRE-OP EXAMINATION: ICD-10-CM

## 2024-01-20 LAB
ATRIAL RATE: 57 BPM
P AXIS: 63 DEGREES
PR INTERVAL: 164 MS
QRS AXIS: 46 DEGREES
QRSD INTERVAL: 98 MS
QT INTERVAL: 406 MS
QTC INTERVAL: 395 MS
T WAVE AXIS: 51 DEGREES
VENTRICULAR RATE: 57 BPM

## 2024-01-21 ENCOUNTER — ANESTHESIA EVENT (OUTPATIENT)
Dept: PERIOP | Facility: HOSPITAL | Age: 59
End: 2024-01-21
Payer: COMMERCIAL

## 2024-01-23 ENCOUNTER — HOSPITAL ENCOUNTER (OUTPATIENT)
Facility: HOSPITAL | Age: 59
Setting detail: OUTPATIENT SURGERY
Discharge: HOME | End: 2024-01-23
Attending: SURGERY | Admitting: SURGERY
Payer: COMMERCIAL

## 2024-01-23 ENCOUNTER — ANESTHESIA (OUTPATIENT)
Dept: PERIOP | Facility: HOSPITAL | Age: 59
End: 2024-01-23
Payer: COMMERCIAL

## 2024-01-23 VITALS
RESPIRATION RATE: 13 BRPM | OXYGEN SATURATION: 98 % | WEIGHT: 159.39 LBS | DIASTOLIC BLOOD PRESSURE: 84 MMHG | TEMPERATURE: 97.1 F | BODY MASS INDEX: 25.02 KG/M2 | SYSTOLIC BLOOD PRESSURE: 140 MMHG | HEART RATE: 74 BPM | HEIGHT: 67 IN

## 2024-01-23 DIAGNOSIS — Z48.89 AFTERCARE FOLLOWING SURGERY: Primary | ICD-10-CM

## 2024-01-23 PROBLEM — F17.200 SMOKING: Status: ACTIVE | Noted: 2024-01-23

## 2024-01-23 PROBLEM — IMO0001 SMOKING: Status: ACTIVE | Noted: 2024-01-23

## 2024-01-23 PROCEDURE — 64718 REVISE ULNAR NERVE AT ELBOW: CPT | Performed by: SURGERY

## 2024-01-23 PROCEDURE — 64721 CARPAL TUNNEL SURGERY: CPT | Performed by: SURGERY

## 2024-01-23 RX ORDER — SODIUM CHLORIDE, SODIUM LACTATE, POTASSIUM CHLORIDE, CALCIUM CHLORIDE 600; 310; 30; 20 MG/100ML; MG/100ML; MG/100ML; MG/100ML
125 INJECTION, SOLUTION INTRAVENOUS CONTINUOUS
Status: DISCONTINUED | OUTPATIENT
Start: 2024-01-23 | End: 2024-01-23 | Stop reason: HOSPADM

## 2024-01-23 RX ORDER — FENTANYL CITRATE/PF 50 MCG/ML
25 SYRINGE (ML) INJECTION
Status: DISCONTINUED | OUTPATIENT
Start: 2024-01-23 | End: 2024-01-23 | Stop reason: HOSPADM

## 2024-01-23 RX ORDER — OXYCODONE HYDROCHLORIDE AND ACETAMINOPHEN 5; 325 MG/1; MG/1
1 TABLET ORAL EVERY 12 HOURS PRN
Qty: 10 TABLET | Refills: 0 | Status: SHIPPED | OUTPATIENT
Start: 2024-01-23

## 2024-01-23 RX ORDER — ONDANSETRON 2 MG/ML
INJECTION INTRAMUSCULAR; INTRAVENOUS AS NEEDED
Status: DISCONTINUED | OUTPATIENT
Start: 2024-01-23 | End: 2024-01-23

## 2024-01-23 RX ORDER — BUPIVACAINE HYDROCHLORIDE 5 MG/ML
INJECTION, SOLUTION EPIDURAL; INTRACAUDAL AS NEEDED
Status: DISCONTINUED | OUTPATIENT
Start: 2024-01-23 | End: 2024-01-23 | Stop reason: HOSPADM

## 2024-01-23 RX ORDER — FENTANYL CITRATE 50 UG/ML
INJECTION, SOLUTION INTRAMUSCULAR; INTRAVENOUS AS NEEDED
Status: DISCONTINUED | OUTPATIENT
Start: 2024-01-23 | End: 2024-01-23

## 2024-01-23 RX ORDER — MIDAZOLAM HYDROCHLORIDE 2 MG/2ML
INJECTION, SOLUTION INTRAMUSCULAR; INTRAVENOUS AS NEEDED
Status: DISCONTINUED | OUTPATIENT
Start: 2024-01-23 | End: 2024-01-23

## 2024-01-23 RX ORDER — OXYCODONE HYDROCHLORIDE 5 MG/1
5 TABLET ORAL EVERY 4 HOURS PRN
Status: DISCONTINUED | OUTPATIENT
Start: 2024-01-23 | End: 2024-01-23 | Stop reason: HOSPADM

## 2024-01-23 RX ORDER — LIDOCAINE HYDROCHLORIDE 20 MG/ML
INJECTION, SOLUTION EPIDURAL; INFILTRATION; INTRACAUDAL; PERINEURAL AS NEEDED
Status: DISCONTINUED | OUTPATIENT
Start: 2024-01-23 | End: 2024-01-23

## 2024-01-23 RX ORDER — ACETAMINOPHEN 325 MG/1
650 TABLET ORAL EVERY 6 HOURS PRN
Status: DISCONTINUED | OUTPATIENT
Start: 2024-01-23 | End: 2024-01-23 | Stop reason: HOSPADM

## 2024-01-23 RX ORDER — DEXAMETHASONE SODIUM PHOSPHATE 10 MG/ML
INJECTION, SOLUTION INTRAMUSCULAR; INTRAVENOUS AS NEEDED
Status: DISCONTINUED | OUTPATIENT
Start: 2024-01-23 | End: 2024-01-23

## 2024-01-23 RX ORDER — HYDROMORPHONE HCL/PF 1 MG/ML
0.5 SYRINGE (ML) INJECTION
Status: DISCONTINUED | OUTPATIENT
Start: 2024-01-23 | End: 2024-01-23 | Stop reason: HOSPADM

## 2024-01-23 RX ORDER — ONDANSETRON 2 MG/ML
4 INJECTION INTRAMUSCULAR; INTRAVENOUS ONCE AS NEEDED
Status: DISCONTINUED | OUTPATIENT
Start: 2024-01-23 | End: 2024-01-23 | Stop reason: HOSPADM

## 2024-01-23 RX ORDER — PROPOFOL 10 MG/ML
INJECTION, EMULSION INTRAVENOUS AS NEEDED
Status: DISCONTINUED | OUTPATIENT
Start: 2024-01-23 | End: 2024-01-23

## 2024-01-23 RX ORDER — MAGNESIUM HYDROXIDE 1200 MG/15ML
LIQUID ORAL AS NEEDED
Status: DISCONTINUED | OUTPATIENT
Start: 2024-01-23 | End: 2024-01-23 | Stop reason: HOSPADM

## 2024-01-23 RX ORDER — MEPERIDINE HYDROCHLORIDE 25 MG/ML
12.5 INJECTION INTRAMUSCULAR; INTRAVENOUS; SUBCUTANEOUS
Status: DISCONTINUED | OUTPATIENT
Start: 2024-01-23 | End: 2024-01-23 | Stop reason: HOSPADM

## 2024-01-23 RX ADMIN — DEXAMETHASONE SODIUM PHOSPHATE 10 MG: 10 INJECTION INTRAMUSCULAR; INTRAVENOUS at 10:50

## 2024-01-23 RX ADMIN — FENTANYL CITRATE 50 MCG: 50 INJECTION INTRAMUSCULAR; INTRAVENOUS at 10:50

## 2024-01-23 RX ADMIN — LIDOCAINE HYDROCHLORIDE 80 MG: 20 INJECTION, SOLUTION EPIDURAL; INFILTRATION; INTRACAUDAL at 10:46

## 2024-01-23 RX ADMIN — FENTANYL CITRATE 50 MCG: 50 INJECTION INTRAMUSCULAR; INTRAVENOUS at 11:03

## 2024-01-23 RX ADMIN — MIDAZOLAM 2 MG: 1 INJECTION INTRAMUSCULAR; INTRAVENOUS at 10:41

## 2024-01-23 RX ADMIN — PROPOFOL 200 MG: 10 INJECTION, EMULSION INTRAVENOUS at 10:46

## 2024-01-23 RX ADMIN — ONDANSETRON 4 MG: 2 INJECTION INTRAMUSCULAR; INTRAVENOUS at 10:50

## 2024-01-23 RX ADMIN — SODIUM CHLORIDE, SODIUM LACTATE, POTASSIUM CHLORIDE, AND CALCIUM CHLORIDE 125 ML/HR: .6; .31; .03; .02 INJECTION, SOLUTION INTRAVENOUS at 09:15

## 2024-01-23 NOTE — OP NOTE
OPERATIVE REPORT  PATIENT NAME: Mu Gruber    :  1965  MRN: [id number]  Pt Location: [facility] OR ROOM 03    SURGERY DATE: 2024    Surgeons and Role:     * Herrera Smith MD - Primary    Preop Diagnosis:  Carpal tunnel syndrome on left [G56.02]  Cubital tunnel syndrome on left [G56.22]    Post-Op Diagnosis Codes:     * Carpal tunnel syndrome on left [G56.02]     * Cubital tunnel syndrome on left [G56.22]    Procedure(s):  Left - RELEASE CUBITAL TUNNEL. LEFT  Left - CTR. LEFT    Specimen(s):  * No specimens in log *    Estimated Blood Loss:   Minimal    Drains:  * No LDAs found *    Anesthesia Type:   General    Operative Indications:  Carpal tunnel syndrome on left [G56.02]  Cubital tunnel syndrome on left [G56.22]      Operative Findings:  Healthy appearing ulnar nerve after release    Complications:   None    Procedure and Technique:  The left upper extremity was prepped and draped in a sterile fashion. A sterile tourniquet was applied.  An esmarch was used to exsanguinate the left upper extremity, and the tourniquet was insufflated to 250 mm Hg.  A curvilinear incision was made anterior to the path of the ulnar nerve at the level of the medial elbow.  A combination of blunt and sharp dissection was performed through the subcutaneous tissues down to the level of the cubital tunnel.  The cubital tunnel was opened to expose the underlying ulnar nerve.  The release was then extended proximally and distally from the medial epicondyle for a distance 6-8 cm.  The ulnar nerve was healthy in appearance.  The wounds irrigated copiously with normal saline.  The skin was approximated with 3-0 deep dermal Vicryl followed by glue and Steri-Strips.  A field block was performed with Marcaine 0.25% plain.     A longitudinal incision was made overlying the transverse carpal ligament in line with the ring finger in a flexed position.  Sharp dissection was performed down to the level of the transverse carpal ligament.  DANIELLE Person  was used for counterretraction.  The transverse carpal ligament was divided with a 15 blade scalpel distally, and tenotomy scissors proximally along with a portion of the distal antebrachial fascia.  The wound was irrigated copiously with normal saline.  The skin was approximated with 4-0 nylon in an interrupted horizontal mattress fashion.  Xeroform was applied followed by gauze and an ace bandage over wrap from the hand to the shoulder.  Additional gauze was placed along the cubital tunnel release during the wrap.  The tourniquet was released and the patient was extubated. The patient was transferred to recovery room in stable condition.      I was present for the entire procedure.    Patient Disposition:  PACU         SIGNATURE: Herrera Smith MD  DATE: January 23, 2024  TIME: 11:29 AM

## 2024-01-23 NOTE — ANESTHESIA POSTPROCEDURE EVALUATION
"Post-Op Assessment Note    CV Status:  Stable    Pain management: adequate       Mental Status:  Alert and awake   Hydration Status:  Euvolemic   PONV Controlled:  Controlled   Airway Patency:  Patent     Post Op Vitals Reviewed: Yes    No anethesia notable event occurred.    Staff: Anesthesiologist               /95 (01/23/24 1212)    Temp (!) 97 °F (36.1 °C) (01/23/24 1212)    Pulse (!) 54 (01/23/24 1212)   Resp 13 (01/23/24 1212)    SpO2 96 % (01/23/24 1212)    /95   Pulse (!) 54   Temp (!) 97 °F (36.1 °C) (Temporal)   Resp 13   Ht 5' 7\" (1.702 m)   Wt 72.3 kg (159 lb 6.3 oz)   SpO2 96%   BMI 24.96 kg/m²     "

## 2024-01-23 NOTE — DISCHARGE INSTR - AVS FIRST PAGE
Elevate hand above heart as much as possible to help pain and swelling.  May use hand for simple tasks, but no heavy lifting or tight squeezing x 4 weeks.  Keep operative bandage clean and dry. You may remove bandage in 4 days, just place a band-aid over incision.  Sutures in the palm will be removed at office follow-up.  The steri-strips on the elbow will fall off on their own.    You are permitted to shower after 4 days with band-aid off.  Perform simple finger motion exercises: opening & closing fingers 10 x every hr.  Follow-up in the office 2/6/2024 per your scheduled appointment.

## 2024-01-23 NOTE — LETTER
[facility] OPERATING ROOM  [address] [facility] [address]  Dept: [phone]    January 23, 2024     Patient: Mu Gruber   YOB: 1965   Date of Visit: 1/23/2024       To Whom it May Concern:    Mu Gruber is under my professional care. He was seen in the hospital from 1/23/2024 to 01/23/24. He may return to work on 1/30/24 with the following limitations no heavy lifting more than 10 pounds.   .    If you have any questions or concerns, please don't hesitate to call.         Sincerely,          Herrera Smith MD

## 2024-01-23 NOTE — INTERVAL H&P NOTE
H&P reviewed. After examining the patient I find no changes in the patients condition since the H&P had been written.    Vitals:    01/23/24 0858   BP: 129/87   Pulse: 55   Resp: 16   Temp: 97.8 °F (36.6 °C)   SpO2: 99%

## 2024-01-23 NOTE — ANESTHESIA PREPROCEDURE EVALUATION
Procedure:  RELEASE CUBITAL TUNNEL, LEFT (Left: Elbow)  CTR, LEFT (Left: Wrist)    Relevant Problems   CARDIO   (+) Rib pain on left side      NEURO/PSYCH   (+) Left hand paresthesia      PULMONARY   (+) Smoking        Physical Exam    Airway    Mallampati score: I  TM Distance: >3 FB  Neck ROM: full     Dental   Comment: Poor dentition with multiple chips and broken teeth, denies loose     Cardiovascular  Rhythm: regular, Rate: normal, Cardiovascular exam normal    Pulmonary  Pulmonary exam normal Breath sounds clear to auscultation    Other Findings        Anesthesia Plan  ASA Score- 2     Anesthesia Type- general with ASA Monitors.         Additional Monitors:     Airway Plan: LMA.           Plan Factors-Exercise tolerance (METS): >4 METS.    Chart reviewed.   Existing labs reviewed. Patient summary reviewed.    Patient is a current smoker.  Patient instructed to abstain from smoking on day of procedure.             Induction- intravenous.    Postoperative Plan-     Informed Consent-

## 2024-02-06 ENCOUNTER — OFFICE VISIT (OUTPATIENT)
Dept: OBGYN CLINIC | Facility: CLINIC | Age: 59
End: 2024-02-06

## 2024-02-06 VITALS
DIASTOLIC BLOOD PRESSURE: 71 MMHG | WEIGHT: 159 LBS | HEIGHT: 67 IN | SYSTOLIC BLOOD PRESSURE: 119 MMHG | BODY MASS INDEX: 24.96 KG/M2

## 2024-02-06 DIAGNOSIS — G56.02 CARPAL TUNNEL SYNDROME ON LEFT: Primary | ICD-10-CM

## 2024-02-06 DIAGNOSIS — G56.22 CUBITAL TUNNEL SYNDROME ON LEFT: ICD-10-CM

## 2024-02-06 PROCEDURE — 99024 POSTOP FOLLOW-UP VISIT: CPT | Performed by: SURGERY

## 2024-02-06 NOTE — PROGRESS NOTES
[facility] Orthopedic Surgery  Patient Name:  Mu Gruber  Surgery:  Left carpal and cubital tunnel release  Surgery Date:  1/23/2024      Subjective: Patient reports overall doing well with improvement in symptoms since his surgery.  He is now able to make a full fist.  Denies any issues with incisions.  No fever or chills.      Objective:    Vitals:    02/06/24 0935   BP: 119/71   Left upper extremity  Medial elbow incision is healed with scab and resolving ecchymosis.  Palmar incision is clean dry intact, healed.  There is no wound dehiscence or erythema.  There is significant muscle wasting of the left hand with some ulnar clawing.  Full composite fist  Sensation intact to light touch median radial ulnar nerve distribution  Palpable radial pulse        Assessment:  S/p left carpal and cubital tunnel release 1/23/2024.  Doing well with improved symptoms.  Sutures removed.      Plan:    Activities as tolerated and increase as able.  No specific restrictions from our standpoint.  Massage and moisturize the incisional areas.  May follow-up on an as-needed basis with instructions to call the office with any questions or concerns.          Suture removal    Date/Time: 2/6/2024 9:45 AM    Performed by: Earl Preciado PA-C  Authorized by: Herrera Smith MD  Universal Protocol:  Consent: Verbal consent obtained.  Risks and benefits: risks, benefits and alternatives were discussed  Consent given by: patient  Patient identity confirmed: verbally with patient      Patient location:  Clinic  Location:     Laterality:  Left    Location:  Upper extremity    Upper extremity location:  Hand    Hand location:  L hand  Procedure details:     Tools used:  Suture removal kit    Wound appearance:  No sign(s) of infection, good wound healing and clean  Post-procedure details:     Post-removal:  No dressing applied    Patient tolerance of procedure:  Tolerated well, no immediate complications

## 2024-03-16 ENCOUNTER — HOSPITAL ENCOUNTER (EMERGENCY)
Facility: HOSPITAL | Age: 59
Discharge: HOME | End: 2024-03-16
Attending: EMERGENCY MEDICINE | Admitting: EMERGENCY MEDICINE
Payer: COMMERCIAL

## 2024-03-16 ENCOUNTER — APPOINTMENT (EMERGENCY)
Dept: RADIOLOGY | Facility: HOSPITAL | Age: 59
End: 2024-03-16
Payer: COMMERCIAL

## 2024-03-16 VITALS
RESPIRATION RATE: 16 BRPM | TEMPERATURE: 98.3 F | DIASTOLIC BLOOD PRESSURE: 85 MMHG | BODY MASS INDEX: 25.14 KG/M2 | OXYGEN SATURATION: 99 % | HEART RATE: 56 BPM | SYSTOLIC BLOOD PRESSURE: 132 MMHG | WEIGHT: 160.5 LBS

## 2024-03-16 DIAGNOSIS — J40 BRONCHITIS: Primary | ICD-10-CM

## 2024-03-16 LAB
ALBUMIN SERPL BCP-MCNC: 4.7 G/DL (ref 3.5–5)
ALP SERPL-CCNC: 58 U/L (ref 34–104)
ALT SERPL W P-5'-P-CCNC: 30 U/L (ref 7–52)
ANION GAP SERPL CALCULATED.3IONS-SCNC: 8 MMOL/L (ref 4–13)
AST SERPL W P-5'-P-CCNC: 28 U/L (ref 13–39)
BASOPHILS # BLD AUTO: 0.06 THOUSANDS/ÂΜL (ref 0–0.1)
BASOPHILS NFR BLD AUTO: 1 % (ref 0–1)
BILIRUB SERPL-MCNC: 0.9 MG/DL (ref 0.2–1)
BUN SERPL-MCNC: 18 MG/DL (ref 5–25)
CALCIUM SERPL-MCNC: 9.9 MG/DL (ref 8.4–10.2)
CARDIAC TROPONIN I PNL SERPL HS: 4 NG/L
CHLORIDE SERPL-SCNC: 103 MMOL/L (ref 96–108)
CO2 SERPL-SCNC: 26 MMOL/L (ref 21–32)
CREAT SERPL-MCNC: 0.97 MG/DL (ref 0.6–1.3)
EOSINOPHIL # BLD AUTO: 0.5 THOUSAND/ÂΜL (ref 0–0.61)
EOSINOPHIL NFR BLD AUTO: 9 % (ref 0–6)
ERYTHROCYTE [DISTWIDTH] IN BLOOD BY AUTOMATED COUNT: 12.6 % (ref 11.6–15.1)
FLUAV RNA RESP QL NAA+PROBE: NEGATIVE
FLUBV RNA RESP QL NAA+PROBE: NEGATIVE
GFR SERPL CREATININE-BSD FRML MDRD: 85 ML/MIN/1.73SQ M
GLUCOSE SERPL-MCNC: 110 MG/DL (ref 65–140)
HCT VFR BLD AUTO: 43.7 % (ref 36.5–49.3)
HGB BLD-MCNC: 15.2 G/DL (ref 12–17)
IMM GRANULOCYTES # BLD AUTO: 0.01 THOUSAND/UL (ref 0–0.2)
IMM GRANULOCYTES NFR BLD AUTO: 0 % (ref 0–2)
LYMPHOCYTES # BLD AUTO: 1.54 THOUSANDS/ÂΜL (ref 0.6–4.47)
LYMPHOCYTES NFR BLD AUTO: 28 % (ref 14–44)
MCH RBC QN AUTO: 32.6 PG (ref 26.8–34.3)
MCHC RBC AUTO-ENTMCNC: 34.8 G/DL (ref 31.4–37.4)
MCV RBC AUTO: 94 FL (ref 82–98)
MONOCYTES # BLD AUTO: 0.42 THOUSAND/ÂΜL (ref 0.17–1.22)
MONOCYTES NFR BLD AUTO: 8 % (ref 4–12)
NEUTROPHILS # BLD AUTO: 3.02 THOUSANDS/ÂΜL (ref 1.85–7.62)
NEUTS SEG NFR BLD AUTO: 54 % (ref 43–75)
NRBC BLD AUTO-RTO: 0 /100 WBCS
PLATELET # BLD AUTO: 330 THOUSANDS/UL (ref 149–390)
PMV BLD AUTO: 9.4 FL (ref 8.9–12.7)
POTASSIUM SERPL-SCNC: 4.6 MMOL/L (ref 3.5–5.3)
PROT SERPL-MCNC: 7.6 G/DL (ref 6.4–8.4)
RBC # BLD AUTO: 4.66 MILLION/UL (ref 3.88–5.62)
RSV RNA RESP QL NAA+PROBE: NEGATIVE
SARS-COV-2 RNA RESP QL NAA+PROBE: NEGATIVE
SODIUM SERPL-SCNC: 137 MMOL/L (ref 135–147)
WBC # BLD AUTO: 5.55 THOUSAND/UL (ref 4.31–10.16)

## 2024-03-16 PROCEDURE — 99285 EMERGENCY DEPT VISIT HI MDM: CPT

## 2024-03-16 PROCEDURE — 96374 THER/PROPH/DIAG INJ IV PUSH: CPT

## 2024-03-16 PROCEDURE — 96361 HYDRATE IV INFUSION ADD-ON: CPT

## 2024-03-16 PROCEDURE — 99284 EMERGENCY DEPT VISIT MOD MDM: CPT

## 2024-03-16 PROCEDURE — 71046 X-RAY EXAM CHEST 2 VIEWS: CPT

## 2024-03-16 PROCEDURE — 80053 COMPREHEN METABOLIC PANEL: CPT

## 2024-03-16 PROCEDURE — 0241U HB NFCT DS VIR RESP RNA 4 TRGT: CPT

## 2024-03-16 PROCEDURE — 93005 ELECTROCARDIOGRAM TRACING: CPT

## 2024-03-16 PROCEDURE — 94640 AIRWAY INHALATION TREATMENT: CPT

## 2024-03-16 PROCEDURE — 36415 COLL VENOUS BLD VENIPUNCTURE: CPT

## 2024-03-16 PROCEDURE — 85025 COMPLETE CBC W/AUTO DIFF WBC: CPT

## 2024-03-16 PROCEDURE — 84484 ASSAY OF TROPONIN QUANT: CPT

## 2024-03-16 RX ORDER — KETOROLAC TROMETHAMINE 30 MG/ML
15 INJECTION, SOLUTION INTRAMUSCULAR; INTRAVENOUS ONCE
Status: COMPLETED | OUTPATIENT
Start: 2024-03-16 | End: 2024-03-16

## 2024-03-16 RX ORDER — ACETAMINOPHEN 325 MG/1
975 TABLET ORAL ONCE
Status: COMPLETED | OUTPATIENT
Start: 2024-03-16 | End: 2024-03-16

## 2024-03-16 RX ORDER — ALBUTEROL SULFATE 90 UG/1
1-2 AEROSOL, METERED RESPIRATORY (INHALATION) EVERY 6 HOURS PRN
Qty: 6.7 G | Refills: 0 | Status: SHIPPED | OUTPATIENT
Start: 2024-03-16

## 2024-03-16 RX ORDER — IPRATROPIUM BROMIDE AND ALBUTEROL SULFATE 2.5; .5 MG/3ML; MG/3ML
3 SOLUTION RESPIRATORY (INHALATION)
Status: DISCONTINUED | OUTPATIENT
Start: 2024-03-16 | End: 2024-03-16 | Stop reason: HOSPADM

## 2024-03-16 RX ORDER — AZITHROMYCIN 250 MG/1
TABLET, FILM COATED ORAL
Qty: 6 TABLET | Refills: 0 | Status: SHIPPED | OUTPATIENT
Start: 2024-03-16 | End: 2024-03-20

## 2024-03-16 RX ADMIN — KETOROLAC TROMETHAMINE 15 MG: 30 INJECTION, SOLUTION INTRAMUSCULAR; INTRAVENOUS at 16:05

## 2024-03-16 RX ADMIN — SODIUM CHLORIDE 500 ML: 0.9 INJECTION, SOLUTION INTRAVENOUS at 16:05

## 2024-03-16 RX ADMIN — ACETAMINOPHEN 325MG 975 MG: 325 TABLET ORAL at 15:58

## 2024-03-16 RX ADMIN — IPRATROPIUM BROMIDE AND ALBUTEROL SULFATE 3 ML: 2.5; .5 SOLUTION RESPIRATORY (INHALATION) at 15:58

## 2024-03-16 NOTE — DISCHARGE INSTRUCTIONS
Take antibiotic for 5 days as prescribed. Use inhaler as needed for chest tightness. May use Tylenol and Motrin for pain. Follow-up with PCP and return to ED for any worsening symptoms.

## 2024-03-16 NOTE — Clinical Note
Mu Gruber was seen and treated in our emergency department on 3/16/2024.    No restrictions            Diagnosis:     Mu  may return to work on return date.    He may return on this date: 03/22/2024         If you have any questions or concerns, please don't hesitate to call.      FIFI Avilez    ______________________________           _______________          _______________  Hospital Representative                              Date                                Time

## 2024-03-16 NOTE — ED PROVIDER NOTES
"History  Chief Complaint   Patient presents with    Cold Like Symptoms     Cough - green phlegm, stiff neck, sx's x 2 weeks. Dizziness upon standing. Thinks he has \"pneumonia or meningitis\"     Patient is a 59 year old male with a past medical history including GERD. Presenting today for runny nose/nasal congestion, a cough, stiff neck, and dizziness. States that he began feeling ill about 2 weeks ago but symptoms had become worse in the last few days. Patient states that he has a productive cough and bringing up green phlegm. During coughing episodes, he reports upper back pain that radiates down his spine, neck pain, and pain in both arms. Also feels like his neck is stiff. Denies any headaches or recent travel. He has been managing his symptoms with ibuprofen and reports some relief with that. Patient also states that he has felt off balance when he stands feeling, \"like I need to take another step\" to regain his balance. He does not report any spinning sensation or changes to vision at that time. Patient denies any fever, chills, chest pain, shortness of breath, abdominal pain, nausea, vomiting, or diarrhea. Patient has not had any viral testing. Patient is a current smoker.          Prior to Admission Medications   Prescriptions Last Dose Informant Patient Reported? Taking?   glucosamine-chondroitin 500-400 MG tablet   Yes No   Sig: Take 1 tablet by mouth 3 (three) times a day   ibuprofen (MOTRIN) 200 mg tablet   Yes No   Sig: Take by mouth every 6 (six) hours as needed for mild pain   lactase (LACTAID) 3,000 units tablet   Yes No   Sig: Take 3,000 Units by mouth if needed   methocarbamol (ROBAXIN) 500 mg tablet   No No   Sig: Take 1 tablet (500 mg total) by mouth 2 (two) times a day   Patient not taking: Reported on 6/24/2022   methocarbamol (ROBAXIN) 500 mg tablet   No No   Sig: Take 1 tablet (500 mg total) by mouth 4 (four) times a day for 7 days   methocarbamol (ROBAXIN) 750 mg tablet   No No   Sig: Take 1 " tablet (750 mg total) by mouth 2 (two) times a day as needed for muscle spasms   multivitamin (THERAGRAN) TABS   Yes No   Sig: Take 1 tablet by mouth daily   naproxen (NAPROSYN) 500 mg tablet   No No   Sig: Take 1 tablet (500 mg total) by mouth 2 (two) times a day as needed for mild pain or moderate pain   omeprazole (PriLOSEC OTC) 20 MG tablet   Yes No   Sig: Take 20 mg by mouth if needed   oxyCODONE-acetaminophen (Percocet) 5-325 mg per tablet   No No   Sig: Take 1 tablet by mouth every 12 (twelve) hours as needed for severe pain Max Daily Amount: 2 tablets   Patient not taking: Reported on 2/6/2024      Facility-Administered Medications: None       Past Medical History:   Diagnosis Date    Colon polyp     GERD (gastroesophageal reflux disease)        Past Surgical History:   Procedure Laterality Date    COLONOSCOPY      MO NEUROPLASTY &/TRANSPOS MEDIAN NRV CARPAL TUNNE Left 1/23/2024    Procedure: CTR, LEFT;  Surgeon: Herrera Smith MD;  Location: [facility];  Service: Orthopedics    MO NEUROPLASTY &/TRANSPOSITION ULNAR NERVE ELBOW Left 1/23/2024    Procedure: RELEASE CUBITAL TUNNEL, LEFT;  Surgeon: Herrera Smith MD;  Location: [facility];  Service: Orthopedics       Family History   Problem Relation Age of Onset    Cancer Father      I have reviewed and agree with the history as documented.    E-Cigarette/Vaping    E-Cigarette Use Never User      E-Cigarette/Vaping Substances     Social History     Tobacco Use    Smoking status: Every Day     Current packs/day: 0.25     Average packs/day: 0.3 packs/day for 10.0 years (2.5 ttl pk-yrs)     Types: Cigarettes    Smokeless tobacco: Never   Vaping Use    Vaping status: Never Used   Substance Use Topics    Alcohol use: Yes     Alcohol/week: 10.0 standard drinks of alcohol     Types: 10 Standard drinks or equivalent per week     Comment: occasional    Drug use: Yes     Types: Marijuana     Comment: smoking       Review of Systems   Constitutional:  Positive for fatigue.  Negative for chills and fever.   HENT:  Positive for congestion, rhinorrhea and sore throat.    Eyes:  Negative for visual disturbance.   Respiratory:  Positive for cough. Negative for chest tightness and shortness of breath.    Cardiovascular:  Negative for chest pain and palpitations.   Gastrointestinal:  Negative for abdominal pain, diarrhea, nausea and vomiting.   Genitourinary:  Negative for decreased urine volume and dysuria.   Musculoskeletal:  Positive for back pain and neck pain.   Skin:  Negative for rash.   Neurological:  Negative for dizziness, weakness, light-headedness and headaches.   All other systems reviewed and are negative.      Physical Exam  Physical Exam  Vitals and nursing note reviewed.   Constitutional:       Appearance: Normal appearance.   HENT:      Head: Normocephalic and atraumatic.      Right Ear: Tympanic membrane, ear canal and external ear normal.      Left Ear: Tympanic membrane, ear canal and external ear normal.      Nose: Rhinorrhea present.      Mouth/Throat:      Mouth: Mucous membranes are moist.      Pharynx: Oropharynx is clear. Posterior oropharyngeal erythema present.   Cardiovascular:      Rate and Rhythm: Normal rate and regular rhythm.      Heart sounds: Normal heart sounds.   Pulmonary:      Effort: Pulmonary effort is normal.      Comments: Fine crackles in bilateral bases  Abdominal:      General: Bowel sounds are normal. There is no distension.      Tenderness: There is no abdominal tenderness.   Musculoskeletal:         General: Tenderness (across trapezius muscles bilaterally) present. Normal range of motion.      Cervical back: Normal range of motion and neck supple. No rigidity or tenderness.   Lymphadenopathy:      Cervical: No cervical adenopathy.   Skin:     General: Skin is warm and dry.      Capillary Refill: Capillary refill takes less than 2 seconds.   Neurological:      General: No focal deficit present.      Mental Status: He is alert and oriented to  person, place, and time.      GCS: GCS eye subscore is 4. GCS verbal subscore is 5. GCS motor subscore is 6.   Psychiatric:         Mood and Affect: Mood normal.         Behavior: Behavior normal.         Vital Signs  ED Triage Vitals   Temperature Pulse Respirations Blood Pressure SpO2   03/16/24 1454 03/16/24 1454 03/16/24 1454 03/16/24 1454 03/16/24 1454   98.3 °F (36.8 °C) 65 18 136/89 100 %      Temp Source Heart Rate Source Patient Position - Orthostatic VS BP Location FiO2 (%)   03/16/24 1454 03/16/24 1629 -- -- --   Oral Monitor         Pain Score       03/16/24 1454       7           Vitals:    03/16/24 1454 03/16/24 1629   BP: 136/89 132/85   Pulse: 65 56         Visual Acuity  Visual Acuity      Flowsheet Row Most Recent Value   L Pupil Size (mm) 3   R Pupil Size (mm) 3            ED Medications  Medications   sodium chloride 0.9 % bolus 500 mL (500 mL Intravenous New Bag 3/16/24 1605)   ipratropium-albuterol (DUO-NEB) 0.5-2.5 mg/3 mL inhalation solution 3 mL (3 mL Nebulization Given 3/16/24 1558)   acetaminophen (TYLENOL) tablet 975 mg (975 mg Oral Given 3/16/24 1558)   ketorolac (TORADOL) injection 15 mg (15 mg Intravenous Given 3/16/24 1605)       Diagnostic Studies  Results Reviewed       Procedure Component Value Units Date/Time    HS Troponin 0hr (reflex protocol) [[id number]]  (Normal) Collected: 03/16/24 1603    Lab Status: Final result Specimen: Blood from Arm, Left Updated: 03/16/24 1701     hs TnI 0hr 4 ng/L     Comprehensive metabolic panel [[id number]] Collected: 03/16/24 1603    Lab Status: Final result Specimen: Blood from Arm, Left Updated: 03/16/24 1658     Sodium 137 mmol/L      Potassium 4.6 mmol/L      Chloride 103 mmol/L      CO2 26 mmol/L      ANION GAP 8 mmol/L      BUN 18 mg/dL      Creatinine 0.97 mg/dL      Glucose 110 mg/dL      Calcium 9.9 mg/dL      AST 28 U/L      ALT 30 U/L      Alkaline Phosphatase 58 U/L      Total Protein 7.6 g/dL      Albumin 4.7 g/dL      Total Bilirubin  0.90 mg/dL      eGFR 85 ml/min/1.73sq m     Narrative:      National Kidney Disease Foundation guidelines for Chronic Kidney Disease (CKD):     Stage 1 with normal or high GFR (GFR > 90 mL/min/1.73 square meters)    Stage 2 Mild CKD (GFR = 60-89 mL/min/1.73 square meters)    Stage 3A Moderate CKD (GFR = 45-59 mL/min/1.73 square meters)    Stage 3B Moderate CKD (GFR = 30-44 mL/min/1.73 square meters)    Stage 4 Severe CKD (GFR = 15-29 mL/min/1.73 square meters)    Stage 5 End Stage CKD (GFR <15 mL/min/1.73 square meters)  Note: GFR calculation is accurate only with a steady state creatinine    CBC and differential [[id number]]  (Abnormal) Collected: 03/16/24 1603    Lab Status: Final result Specimen: Blood from Arm, Left Updated: 03/16/24 1638     WBC 5.55 Thousand/uL      RBC 4.66 Million/uL      Hemoglobin 15.2 g/dL      Hematocrit 43.7 %      MCV 94 fL      MCH 32.6 pg      MCHC 34.8 g/dL      RDW 12.6 %      MPV 9.4 fL      Platelets 330 Thousands/uL      nRBC 0 /100 WBCs      Neutrophils Relative 54 %      Immature Grans % 0 %      Lymphocytes Relative 28 %      Monocytes Relative 8 %      Eosinophils Relative 9 %      Basophils Relative 1 %      Neutrophils Absolute 3.02 Thousands/µL      Absolute Immature Grans 0.01 Thousand/uL      Absolute Lymphocytes 1.54 Thousands/µL      Absolute Monocytes 0.42 Thousand/µL      Eosinophils Absolute 0.50 Thousand/µL      Basophils Absolute 0.06 Thousands/µL     FLU/RSV/COVID - if FLU/RSV clinically relevant [[id number]] Collected: 03/16/24 1603    Lab Status: In process Specimen: Nares from Nose Updated: 03/16/24 1634                   XR chest 2 views    (Results Pending)              Procedures  ECG 12 Lead Documentation Only    Date/Time: 3/16/2024 4:58 PM    Performed by: FIFI Avilez  Authorized by: FIFI Avilez    Indications / Diagnosis:  Dizziness  ECG reviewed by me, the ED Provider: yes    Patient location:  ED  Previous ECG:     Previous  ECG:  Compared to current    Comparison ECG info:  NSR HR 57    Similarity:  No change    Comparison to cardiac monitor: Yes    Interpretation:     Interpretation: normal    Rate:     ECG rate:  60    ECG rate assessment: normal    Rhythm:     Rhythm: sinus rhythm    Ectopy:     Ectopy: none    QRS:     QRS axis:  Normal  Conduction:     Conduction: normal    ST segments:     ST segments:  Normal  T waves:     T waves: normal             ED Course  ED Course as of 03/16/24 1718   Sat Mar 16, 2024   1701 Comprehensive metabolic panel  No electrolyte abnormalities   1701 CBC and differential(!)  No elevated WBC.   1704 XR chest 2 views  My interpretation: no pneumonia, pneumothorax, or rib fx seen. Possible bone remodeling of left ribs compared to previous xray in 06/2023.   1717 FLU/RSV/COVID - if FLU/RSV clinically relevant  Negative COVID/flu/RSV             HEART Risk Score      Flowsheet Row Most Recent Value   Heart Score Risk Calculator    History 0 Filed at: 03/16/2024 1702   ECG 0 Filed at: 03/16/2024 1702   Age 1 Filed at: 03/16/2024 1702   Risk Factors 1 Filed at: 03/16/2024 1702   Troponin 0 Filed at: 03/16/2024 1702   HEART Score 2 Filed at: 03/16/2024 1702                          SBIRT 22yo+      Flowsheet Row Most Recent Value   Initial Alcohol Screen: US AUDIT-C     1. How often do you have a drink containing alcohol? 0 Filed at: 03/16/2024 1505   2. How many drinks containing alcohol do you have on a typical day you are drinking?  0 Filed at: 03/16/2024 1505   3a. Male UNDER 65: How often do you have five or more drinks on one occasion? 0 Filed at: 03/16/2024 1505   Audit-C Score 0 Filed at: 03/16/2024 1505   AISHA: How many times in the past year have you...    Used an illegal drug or used a prescription medication for non-medical reasons? Never Filed at: 03/16/2024 1505                      Medical Decision Making  Patient is a 59-year-old male presenting for cough, stiff neck, and dizziness.  Upon  examination, patient is comfortably on the stretcher.  Vital signs are within normal limits.  Patient is afebrile, normal heart rate, and normotensive. Normal internal canals and TMs bilaterally.  Examination of mouth and throat show no tonsillar swelling or exudate and slight erythema of the throat is present.  No cervical adenopathy.  Normal heart sounds.  Fine clear lungs and bilateral lower bases.  No tenderness upon cervical, thoracic, or lumbar spine.  Tenderness across palpation of the trapezius muscle bilaterally. Negative Kernig sign. Bowel sounds present x 4 and no abdominal tenderness upon palpation.    DDX include but are not limited to: ACS, arrhythmia, electrolyte imbalance, pneumothorax, pneumonia, bronchitis, COVID, influenza, RSV, and viral syndrome.    Treatment plan to include:  Blood work (CBC, CMP, troponin)  Chest x-ray  EKG  COVID/flu/RSV swab  Duo neb  Pain medications  IV hydration      Low suspicion for ACS/arrhythmia given normal EKG, negative troponin, and HEART score=2.  No pneumothorax or pneumonia visualized on my interpretation of the chest x-ray. No abnormalities of electrolytes. Patient is COVID/flu/RSV negative. Patient reports feeling better after medications. Discussed with patient that symptoms are likely another viral syndrome or bronchitis. Due to his history of smoking, he will be placed on azithromycin and will be prescribed an inhaler for chest tightness. Encouraged follow-up with PCP and strict ED return precautions reviewed at this time. Patient verbalizes understanding.     Amount and/or Complexity of Data Reviewed  Labs: ordered. Decision-making details documented in ED Course.     Details: Reviewed with patient.  Radiology: ordered. Decision-making details documented in ED Course.     Details: Reviewed with patient.    Risk  OTC drugs.  Prescription drug management.             Disposition  Final diagnoses:   Bronchitis     Time reflects when diagnosis was documented  in both MDM as applicable and the Disposition within this note       Time User Action Codes Description Comment    3/16/2024  4:52 PM Amy Hahn Add [J40] Bronchitis           ED Disposition       None          Follow-up Information    None         Patient's Medications   Discharge Prescriptions    ALBUTEROL (PROVENTIL HFA) 90 MCG/ACT INHALER    Inhale 1-2 puffs every 6 (six) hours as needed for wheezing       Start Date: 3/16/2024 End Date: --       Order Dose: 1-2 puffs       Quantity: 6.7 g    Refills: 0    AZITHROMYCIN (ZITHROMAX Z-JACEY) 250 MG TABLET    Take 2 tablets today then 1 tablet daily x 4 days       Start Date: 3/16/2024 End Date: 3/20/2024       Order Dose: --       Quantity: 6 tablet    Refills: 0       No discharge procedures on file.    PDMP Review         Value Time User    PDMP Reviewed  Yes 6/8/2022  8:15 PM Jasvir Lindsey PA-C            ED Provider  Electronically Signed by             FIFI Avilez  03/16/24 2027

## 2024-03-17 LAB
ATRIAL RATE: 60 BPM
P AXIS: 57 DEGREES
PR INTERVAL: 150 MS
QRS AXIS: 67 DEGREES
QRSD INTERVAL: 84 MS
QT INTERVAL: 408 MS
QTC INTERVAL: 408 MS
T WAVE AXIS: 57 DEGREES
VENTRICULAR RATE: 60 BPM

## 2024-03-17 PROCEDURE — 93010 ELECTROCARDIOGRAM REPORT: CPT | Performed by: INTERNAL MEDICINE

## 2024-07-01 ENCOUNTER — OFFICE VISIT (OUTPATIENT)
Dept: OBGYN CLINIC | Facility: CLINIC | Age: 59
End: 2024-07-01
Payer: COMMERCIAL

## 2024-07-01 VITALS
DIASTOLIC BLOOD PRESSURE: 70 MMHG | BODY MASS INDEX: 25.11 KG/M2 | SYSTOLIC BLOOD PRESSURE: 121 MMHG | HEIGHT: 67 IN | WEIGHT: 160 LBS

## 2024-07-01 DIAGNOSIS — M54.16 RADICULOPATHY, LUMBAR REGION: ICD-10-CM

## 2024-07-01 DIAGNOSIS — R20.2 PARESTHESIA OF LOWER EXTREMITY: Primary | ICD-10-CM

## 2024-07-01 PROCEDURE — 99214 OFFICE O/P EST MOD 30 MIN: CPT | Performed by: PHYSICIAN ASSISTANT

## 2024-07-01 RX ORDER — GABAPENTIN 100 MG/1
100 CAPSULE ORAL 3 TIMES DAILY
Qty: 90 CAPSULE | Refills: 0 | Status: SHIPPED | OUTPATIENT
Start: 2024-07-01

## 2024-07-01 NOTE — PROGRESS NOTES
"Patient Name:  Mu Gruber  MRN:  [id number]    Assessment & Plan     Lower extremity paresthesias, possible lumbar radiculopathy versus neuropathy.  No red flags appreciated examination today.  Referral to physical therapy for possible and radiculopathy.  Prescription for gabapentin.  Recommend evaluation by his PCP to explore other possibilities of his numbness and tingling/neuropathy.  Follow-up in 6 weeks.  Discussed obtaining MRI lumbar spine if symptoms persist.    Chief Complaint     Lower extremity numbness    History of the Present Illness     Mu Gruber is a 59 y.o. male who reports to the office today for evaluation of his lower extremities.  He notes intermittent numbness and tingling in the lower extremities that began a few months ago.  He denies any injury or trauma.  Patient does work at [employer] which requires a lot of walking and heavy lifting.  He denies any low back pain but notes numbness and tingling primarily in the anterior thigh which extends distally into the lower extremities to the foot.  He does note occasional weakness but denies any instability.  Numbness and tingling is worse with increased activity.  He denies any balance or gait issues.  He denies any bowel or bladder dysfunction.  No saddle paresthesias.  Patient does have a known low back issue that was treated conservatively few years ago.  No fevers or chills.    Physical Exam     /70 (BP Location: Right arm, Patient Position: Sitting, Cuff Size: Standard)   Ht 5' 7\" (1.702 m)   Wt 72.6 kg (160 lb)   BMI 25.06 kg/m²     Lumbar spine: No gross deformity.  No tenderness midline and paraspinal musculature.  No tenderness bilateral SI joints and bilateral piriformis musculature.  Motor intact L2-S1 bilaterally with 5 out of 5 strength.  Negative straight leg raise bilaterally.  Negative CORINNA test bilaterally.  Sensation is intact but diminished over the anterior thigh, medial and lateral lower extremity and dorsum of the foot " bilaterally.    Eyes: Anicteric sclerae.  ENT: Trachea midline.  Lungs: Normal respiratory effort.  CV: Capillary refill is less than 2 seconds.  Skin: Intact without erythema.  Lymph: No palpable lymphadenopathy.  Neuro: Sensation is grossly intact to light touch.  Psych: Mood and affect are appropriate.    Data Review     I have personally reviewed pertinent films in PACS, and my interpretation follows:    X-rays lumbar spine 7/1/2024: No acute osseous abnormality.  No fracture.  Multilevel facet arthrosis and mild DDD appreciated.  Grade 1 anteroisthesis of L4 on L5.    Past Medical History:   Diagnosis Date    Colon polyp     GERD (gastroesophageal reflux disease)        Past Surgical History:   Procedure Laterality Date    COLONOSCOPY      [place] NEUROPLASTY &/TRANSPOS MEDIAN NRV CARPAL TUNNE Left 1/23/2024    Procedure: CTR, LEFT;  Surgeon: Herrera Smith MD;  Location[facility];  Service: Orthopedics    GA NEUROPLASTY &/TRANSPOSITION ULNAR NERVE ELBOW Left 1/23/2024    Procedure: RELEASE CUBITAL TUNNEL, LEFT;  Surgeon: Herrera Smith MD;  Location[facility];  Service: Orthopedics       No Known Allergies    Current Outpatient Medications on File Prior to Visit   Medication Sig Dispense Refill    glucosamine-chondroitin 500-400 MG tablet Take 1 tablet by mouth 3 (three) times a day      ibuprofen (MOTRIN) 200 mg tablet Take by mouth every 6 (six) hours as needed for mild pain      lactase (LACTAID) 3,000 units tablet Take 3,000 Units by mouth if needed      multivitamin (THERAGRAN) TABS Take 1 tablet by mouth daily      omeprazole (PriLOSEC OTC) 20 MG tablet Take 20 mg by mouth if needed      albuterol (Proventil HFA) 90 mcg/act inhaler Inhale 1-2 puffs every 6 (six) hours as needed for wheezing 6.7 g 0    methocarbamol (ROBAXIN) 500 mg tablet Take 1 tablet (500 mg total) by mouth 2 (two) times a day (Patient not taking: Reported on 6/24/2022) 20 tablet 0    methocarbamol (ROBAXIN) 750 mg tablet Take 1 tablet (750  mg total) by mouth 2 (two) times a day as needed for muscle spasms 20 tablet 0    naproxen (NAPROSYN) 500 mg tablet Take 1 tablet (500 mg total) by mouth 2 (two) times a day as needed for mild pain or moderate pain 30 tablet 0    oxyCODONE-acetaminophen (Percocet) 5-325 mg per tablet Take 1 tablet by mouth every 12 (twelve) hours as needed for severe pain Max Daily Amount: 2 tablets (Patient not taking: Reported on 2/6/2024) 10 tablet 0     No current facility-administered medications on file prior to visit.       Social History     Tobacco Use    Smoking status: Every Day     Current packs/day: 0.25     Average packs/day: 0.3 packs/day for 10.0 years (2.5 ttl pk-yrs)     Types: Cigarettes    Smokeless tobacco: Never   Vaping Use    Vaping status: Never Used   Substance Use Topics    Alcohol use: Yes     Alcohol/week: 10.0 standard drinks of alcohol     Types: 10 Standard drinks or equivalent per week     Comment: occasional    Drug use: Yes     Types: Marijuana     Comment: smoking       Family History   Problem Relation Age of Onset    Cancer Father        Review of Systems     As stated in the HPI. All other systems reviewed and are negative.

## 2024-08-01 ENCOUNTER — OFFICE VISIT (OUTPATIENT)
Dept: OBGYN CLINIC | Facility: CLINIC | Age: 59
End: 2024-08-01
Payer: COMMERCIAL

## 2024-08-01 VITALS
DIASTOLIC BLOOD PRESSURE: 90 MMHG | SYSTOLIC BLOOD PRESSURE: 130 MMHG | WEIGHT: 155.2 LBS | BODY MASS INDEX: 24.36 KG/M2 | HEIGHT: 67 IN

## 2024-08-01 DIAGNOSIS — R20.2 PARESTHESIA OF LOWER EXTREMITY: ICD-10-CM

## 2024-08-01 DIAGNOSIS — M54.16 RADICULOPATHY, LUMBAR REGION: ICD-10-CM

## 2024-08-01 DIAGNOSIS — Z12.11 ENCOUNTER FOR SCREENING COLONOSCOPY: Primary | ICD-10-CM

## 2024-08-01 PROCEDURE — 99213 OFFICE O/P EST LOW 20 MIN: CPT | Performed by: PHYSICIAN ASSISTANT

## 2024-08-01 RX ORDER — VALERIAN ROOT 250 MG
250 CAPSULE ORAL
COMMUNITY

## 2024-08-01 NOTE — PROGRESS NOTES
Patient Name:  Mu Gruber  MRN:  [id number]    Assessment & Plan     Lumbar spine pain and bilateral lower extremity paresthesias in the setting of multilevel DDD and grade 1 anterior listhesis of L4 on L5, likely lumbar radiculopathy.  No red flags appreciated on examination today.  Patient notes persistent symptoms despite conservative management including activity modification, gabapentin, and home exercises and inversion table use.  Due to persistent symptoms MRI of the lumbar spine will be obtained for further evaluation.  EMG of the lower extremities will be obtained as well.  Recommend evaluation with pain management after MRI is complete.  In the meantime continue activity medication and home exercises.  Follow-up with pain management after MRI.  Referral placed today.    Chief Complaint     Follow-up lower extremity paresthesias.    History of the Present Illness     Mu Gruber is a 59 y.o. male who returns to the office today for follow-up regarding his lower extremity paresthesias.  He was initially seen on 7/1/2024.  At that time he was prescribed gabapentin and was also referred to physical therapy.  He returns to the office today noting no significant improvement in his symptoms.  He has been taking the gabapentin without noticeable improvement.  He did not attend formal physical therapy but has been performing home exercises as well as utilizing an inversion table with limited improvement.  He notes fluctuating symptoms in his lower extremities and does also report intermittent low back pain as well.  He works for [employer] which requires a lot of walking and bending and lifting.  He notes intermittent low back pain with radiation distally along the lower extremities to the feet with associated numbness and tingling in this distribution as well.  Symptoms are worse at work.  He denies any bowel or bladder dysfunction.  No saddle paresthesias.  No fevers or chills.    Physical Exam     /90 (BP  "Location: Right arm, Patient Position: Sitting, Cuff Size: Standard)   Ht 5' 7\" (1.702 m)   Wt 70.4 kg (155 lb 3.2 oz)   BMI 24.31 kg/m²     Lumbar spine: No tenderness midline and paraspinal musculature.  No tenderness bilateral SI joints and bilateral piriformis musculature.  Motor intact L2-S1 bilaterally with 5 of 5 strength.  Negative straight leg raise bilaterally.  Negative CORINNA test bilaterally.  Sensation is intact but diminished in the entire lower extremities bilaterally.    Eyes: Anicteric sclerae.  ENT: Trachea midline.  Lungs: Normal respiratory effort.  CV: Capillary refill is less than 2 seconds.  Skin: Intact without erythema.  Lymph: No palpable lymphadenopathy.  Neuro: Sensation is grossly intact to light touch.  Psych: Mood and affect are appropriate.    Data Review     I have personally reviewed pertinent films in PACS, and my interpretation follows:    X-rays lumbar spine 7/1/2024: No acute osseous abnormality.  No fracture.  Multilevel facet arthrosis and mild DDD appreciated.  Grade 1 anteroisthesis of L4 on L5.     Past Medical History:   Diagnosis Date    Colon polyp     GERD (gastroesophageal reflux disease)        Past Surgical History:   Procedure Laterality Date    COLONOSCOPY      [place] NEUROPLASTY &/TRANSPOS MEDIAN NRV CARPAL TUNNE Left 1/23/2024    Procedure: CTR, LEFT;  Surgeon: Herrera Smith MD;  [facility];  Service: Orthopedics    [place] NEUROPLASTY &/TRANSPOSITION ULNAR NERVE ELBOW Left 1/23/2024    Procedure: RELEASE CUBITAL TUNNEL, LEFT;  Surgeon: Herrera Smith MD;  [facility];  Service: Orthopedics       Allergies   Allergen Reactions    Other Other (See Comments)     Pt reports sensitivity to bleach       Current Outpatient Medications on File Prior to Visit   Medication Sig Dispense Refill    gabapentin (Neurontin) 100 mg capsule Take 1 capsule (100 mg total) by mouth 3 (three) times a day 90 capsule 0    glucosamine-chondroitin 500-400 MG tablet Take 1 tablet " by mouth 3 (three) times a day      ibuprofen (MOTRIN) 200 mg tablet Take by mouth every 6 (six) hours as needed for mild pain      lactase (LACTAID) 3,000 units tablet Take 3,000 Units by mouth if needed      multivitamin (THERAGRAN) TABS Take 1 tablet by mouth daily      omeprazole (PriLOSEC OTC) 20 MG tablet Take 20 mg by mouth if needed      Valerian Root 250 MG CAPS Take 250 mg by mouth      albuterol (Proventil HFA) 90 mcg/act inhaler Inhale 1-2 puffs every 6 (six) hours as needed for wheezing 6.7 g 0    methocarbamol (ROBAXIN) 500 mg tablet Take 1 tablet (500 mg total) by mouth 2 (two) times a day (Patient not taking: Reported on 6/24/2022) 20 tablet 0    methocarbamol (ROBAXIN) 750 mg tablet Take 1 tablet (750 mg total) by mouth 2 (two) times a day as needed for muscle spasms 20 tablet 0    naproxen (NAPROSYN) 500 mg tablet Take 1 tablet (500 mg total) by mouth 2 (two) times a day as needed for mild pain or moderate pain 30 tablet 0    oxyCODONE-acetaminophen (Percocet) 5-325 mg per tablet Take 1 tablet by mouth every 12 (twelve) hours as needed for severe pain Max Daily Amount: 2 tablets (Patient not taking: Reported on 2/6/2024) 10 tablet 0     No current facility-administered medications on file prior to visit.       Social History     Tobacco Use    Smoking status: Some Days     Current packs/day: 0.25     Average packs/day: 0.4 packs/day for 20.0 years (7.5 ttl pk-yrs)     Types: Cigarettes    Smokeless tobacco: Never   Vaping Use    Vaping status: Never Used   Substance Use Topics    Alcohol use: Yes     Alcohol/week: 12.0 standard drinks of alcohol     Types: 12 Shots of liquor per week     Comment: occasional    Drug use: Not Currently     Types: Marijuana     Comment: smoking       Family History   Problem Relation Age of Onset    Cancer Father     Cancer Sister        Review of Systems     As stated in the HPI. All other systems reviewed and are negative.

## 2024-08-07 ENCOUNTER — HOSPITAL ENCOUNTER (OUTPATIENT)
Dept: MRI IMAGING | Facility: HOSPITAL | Age: 59
Discharge: HOME | End: 2024-08-07
Payer: COMMERCIAL

## 2024-08-07 DIAGNOSIS — R20.2 PARESTHESIA OF LOWER EXTREMITY: ICD-10-CM

## 2024-08-07 DIAGNOSIS — M54.16 RADICULOPATHY, LUMBAR REGION: ICD-10-CM

## 2024-08-07 PROCEDURE — 72148 MRI LUMBAR SPINE W/O DYE: CPT

## 2024-08-21 ENCOUNTER — CONSULT (OUTPATIENT)
Dept: PAIN MEDICINE | Facility: CLINIC | Age: 59
End: 2024-08-21
Payer: COMMERCIAL

## 2024-08-21 VITALS
BODY MASS INDEX: 24.33 KG/M2 | WEIGHT: 155 LBS | DIASTOLIC BLOOD PRESSURE: 78 MMHG | SYSTOLIC BLOOD PRESSURE: 128 MMHG | HEIGHT: 67 IN

## 2024-08-21 DIAGNOSIS — M47.14 MYELOPATHY OF THORACIC REGION: ICD-10-CM

## 2024-08-21 DIAGNOSIS — M79.2 NEUROPATHIC PAIN: ICD-10-CM

## 2024-08-21 DIAGNOSIS — M54.16 RADICULOPATHY, LUMBAR REGION: Primary | ICD-10-CM

## 2024-08-21 PROCEDURE — 99204 OFFICE O/P NEW MOD 45 MIN: CPT | Performed by: ANESTHESIOLOGY

## 2024-08-21 RX ORDER — GABAPENTIN 300 MG/1
300 CAPSULE ORAL 3 TIMES DAILY
Qty: 90 CAPSULE | Refills: 1 | Status: SHIPPED | OUTPATIENT
Start: 2024-08-21

## 2024-08-21 NOTE — PROGRESS NOTES
Assessment  1. Radiculopathy, lumbar region    2. Neuropathic pain    3. Myelopathy of thoracic region      Patient presenting with chronic lower extremity numbness, discomfort and difficulty walking for 6+ months.  Denies any bowel or bladder incontinence, saddle anesthesia.    Independently reviewed and interpreted lumbar MRI - this showed degenerative changes worst at L4-5 with grade 1 anterolisthesis as well as severe right and moderate left foraminal narrowing.    There is also a left sided disc herniation at T10-11 on sagittal views with apparent moderate central canal narrowing that we do not have axial views for.    Plan    Patient has been endorsing difficulty walking and balance issues associated with his diffuse lower extremity numbness and tingling.  Given    The findings of disc herniation at T10-11, I recommended the patient to obtain a thoracic MRI.  I will  also refer the patient to orthopedic surgery for further evaluation of his myelopathic symptoms.    In the meantime we will increase his gabapentin to 300 mg 3 times daily.  He previously trialed gabapentin with no effect but he was only on 100 mg.    Patient will follow-up in 1 month for reevaluation after thoracic MRI.    Reviewed external notes from Orthopedics for review and interpretation of recent and prior relevant medical histories, treatment recommendations, medication and/or interventional treatment responses.    Reviewed renal function, CBC prior to recommending, initiating, continuing and/or adjusting medications.    [place] Prescription Drug Monitoring Program report was reviewed and was appropriate     My impressions and treatment recommendations were discussed in detail with the patient who verbalized understanding and had no further questions.  Discharge instructions were provided. I personally saw and examined the patient and I agree with the above discussed plan of care.    Orders Placed This Encounter   Procedures    MRI  thoracic spine wo contrast     Standing Status:   Future     Standing Expiration Date:   8/21/2028     Scheduling Instructions:      There is no preparation for this test. Please leave your jewelry and valuables at home, wedding rings are the exception. All patients will be required to change into a hospital gown and pants.  Street clothes are not permitted in the MRI.  Magnetic nail polish must be removed prior to arrival for your test. Please bring your insurance cards, a form of photo ID and a list of your medications with you. Arrive 15 minutes prior to your appointment time in order to register. Please bring any prior CT or MRI studies of this area that were not performed at a [facility].            To schedule this appointment, please contact Central Scheduling at [phone].            Prior to your appointment, please make sure you complete the MRI Screening Form when you e-Check in for your appointment. This will be available starting 7 days before your appointment in [employer]. You may receive an e-mail with an activation code if you do not have a [employer] account. If you do not have access to a device, we will complete your screening at your appointment.     Order Specific Question:   Reason for Exam     Answer:   myelopathy     Order Specific Question:   What is the patient's sedation requirement?     Answer:   No Sedation     Order Specific Question:   Does the patient need medication for Claustrophobia? If yes, order medication at this point.     Answer:   No     Order Specific Question:   Does the patient wear a life vest, have an implanted cardiac device, a stimulation device, a sleep apnea stimulator, or a breast tissue expansion device?     Answer:   No     Order Specific Question:   Release to patient through [facility]     Answer:   Immediate    Ambulatory referral to Orthopedic Surgery     Standing Status:   Future     Standing Expiration Date:   8/21/2025     Referral Priority:   Routine      Referral Type:   Consult - AMB     Referral Reason:   Specialty Services Required     Requested Specialty:   Orthopedic Surgery     Number of Visits Requested:   1     Expiration Date:   8/21/2025     New Medications Ordered This Visit   Medications    gabapentin (NEURONTIN) 300 mg capsule     Sig: Take 1 capsule (300 mg total) by mouth 3 (three) times a day     Dispense:  90 capsule     Refill:  1       History of Present Illness    Mu Gruber is a 59 y.o. male presenting for consultation (referred by Flavio Andersen PA-C) at [facility] for exam and evaluation of lower extremity numbness and balance difficulty worsening over the past 6 months. Pain started without any precipitating injury or trauma. Over the past month, the intensity of pain has been Moderate. Pain is currently 6/10. Pain does interfere with age appropriate activities of daily living. Pain is nearly constant, with no typical pattern throughout the day. Pain is described as numbness and pins/needles. Patient endorses weakness in the lower extremities. Assistance device used: None.    Worsening factors noted: Standing, bending, sitting, walking, exercise, coughing, sneezing.   Improving factors noted: laying down, rest.    Treatments tried:   PT: no  Chiropractic therapy: no  Injections: no   Previous spine surgery: No    Anticoagulation: no    Medications tried:   Tylenol, aspirin, ibuprofen, gabapentin    I have personally reviewed and/or updated the patient's past medical history, past surgical history, family history, social history, current medications, allergies, and vital signs today.     Review of Systems   Constitutional:  Negative for chills and fever.   HENT:  Negative for ear pain and sore throat.    Eyes:  Negative for pain and visual disturbance.   Respiratory:  Negative for cough and shortness of breath.    Cardiovascular:  Negative for chest pain and palpitations.   Gastrointestinal:  Negative for abdominal  pain and vomiting.   Genitourinary:  Negative for dysuria and hematuria.   Musculoskeletal:  Positive for back pain, gait problem and myalgias. Negative for arthralgias.   Skin:  Negative for color change and rash.   Neurological:  Positive for weakness and numbness. Negative for seizures and syncope.   All other systems reviewed and are negative.      Patient Active Problem List   Diagnosis    Left hand paresthesia    Rib pain on left side    Lesion of lung    Smoking       Past Medical History:   Diagnosis Date    Colon polyp     GERD (gastroesophageal reflux disease)        Past Surgical History:   Procedure Laterality Date    COLONOSCOPY      [place] NEUROPLASTY &/TRANSPOS MEDIAN NRV CARPAL TUNNE Left 1/23/2024    Procedure: CTR, LEFT;  Surgeon: Herrera Smith MD;  [facility];  Service: Orthopedics    [place] NEUROPLASTY &/TRANSPOSITION ULNAR NERVE ELBOW Left 1/23/2024    Procedure: RELEASE CUBITAL TUNNEL, LEFT;  Surgeon: Herrera Smith MD;  [facility];  Service: Orthopedics       Family History   Problem Relation Age of Onset    Cancer Father     Cancer Sister        Social History     Occupational History    Not on file   Tobacco Use    Smoking status: Some Days     Current packs/day: 0.25     Average packs/day: 0.4 packs/day for 20.0 years (7.5 ttl pk-yrs)     Types: Cigarettes    Smokeless tobacco: Never   Vaping Use    Vaping status: Never Used   Substance and Sexual Activity    Alcohol use: Yes     Alcohol/week: 12.0 standard drinks of alcohol     Types: 12 Shots of liquor per week     Comment: occasional    Drug use: Not Currently     Types: Marijuana     Comment: smoking    Sexual activity: Yes     Partners: Male     Birth control/protection: Condom Male       Current Outpatient Medications on File Prior to Visit   Medication Sig    glucosamine-chondroitin 500-400 MG tablet Take 1 tablet by mouth 3 (three) times a day    ibuprofen (MOTRIN) 200 mg tablet Take by mouth every 6 (six) hours as needed for  "mild pain    lactase (LACTAID) 3,000 units tablet Take 3,000 Units by mouth if needed    multivitamin (THERAGRAN) TABS Take 1 tablet by mouth daily    omeprazole (PriLOSEC OTC) 20 MG tablet Take 20 mg by mouth if needed    Valerian Root 250 MG CAPS Take 250 mg by mouth    albuterol (Proventil HFA) 90 mcg/act inhaler Inhale 1-2 puffs every 6 (six) hours as needed for wheezing (Patient not taking: Reported on 8/21/2024)    methocarbamol (ROBAXIN) 500 mg tablet Take 1 tablet (500 mg total) by mouth 2 (two) times a day (Patient not taking: Reported on 6/24/2022)    methocarbamol (ROBAXIN) 750 mg tablet Take 1 tablet (750 mg total) by mouth 2 (two) times a day as needed for muscle spasms (Patient not taking: Reported on 8/21/2024)    naproxen (NAPROSYN) 500 mg tablet Take 1 tablet (500 mg total) by mouth 2 (two) times a day as needed for mild pain or moderate pain (Patient not taking: Reported on 8/21/2024)    oxyCODONE-acetaminophen (Percocet) 5-325 mg per tablet Take 1 tablet by mouth every 12 (twelve) hours as needed for severe pain Max Daily Amount: 2 tablets (Patient not taking: Reported on 2/6/2024)    [DISCONTINUED] gabapentin (Neurontin) 100 mg capsule Take 1 capsule (100 mg total) by mouth 3 (three) times a day (Patient not taking: Reported on 8/21/2024)     No current facility-administered medications on file prior to visit.       Allergies   Allergen Reactions    Other Other (See Comments)     Pt reports sensitivity to bleach       Physical Exam    /78   Ht 5' 7\" (1.702 m)   Wt 70.3 kg (155 lb)   BMI 24.28 kg/m²     Constitutional: normal, well developed, well nourished, alert, in no distress and non-toxic and no overt pain behavior.  Eyes: anicteric  HEENT: grossly intact  Neck: supple, symmetric, trachea midline and no masses   Pulmonary:even and unlabored  Cardiovascular:No edema or pitting edema present  Skin:Normal without rashes or lesions and well hydrated  Psychiatric:Mood and affect " appropriate  Neurologic: Motor function is grossly intact with no focal neurologic deficits   Musculoskeletal: slightly wide based gait    Imaging  MRI lumbar spine  FINDINGS:     VERTEBRAL BODIES: There is a transitional lumbosacral junction. Scattered degenerative endplate changes.  No focally suspicious marrow lesions.  No compression abnormality. Normal marrow signal is identified within the visualized bony structures.  No   discrete marrow lesion. Trace grade 1 anterolisthesis of L4 and L5 noted. Probable limbus vertebra superior endplate of L4, anatomic variant.     Type I Modic endplate changes T10-T11.     SACRUM: Scattered degenerative endplate changes.  No focally suspicious marrow lesions.  No bone marrow edema or compression abnormality.     DISTAL CORD AND CONUS:  Normal size and signal within the distal cord and conus. Conus medullaris terminates at the L2-3 level.     PARASPINAL SOFT TISSUES:  Paraspinal soft tissues are unremarkable.     LOWER THORACIC DISC SPACES: T10-T11: There is loss of disc height and signal with a central/left paracentral disc protrusion. Moderate central canal and left subarticular zone narrowing. Mild right narrowing.     T10-T11: There is a central/left paracentral disc herniation, protrusion type. Moderate central canal narrowing. Moderate left neural foraminal narrowing. Moderate moderate right neural foraminal narrowing.     T11-T12: There is loss of disc height and signal.  There is no focal disc herniation, central canal or neural foraminal narrowing.     T12-L1: There is a diffuse disk bulge.  No significant central canal or neural foraminal narrowing.     LUMBAR DISC SPACES:     L1-L2:  Normal.     L2-L3:  Normal.     L3-L4: There is loss of disc height and signal. There is a left neural foraminal disc protrusion. Severe left neural foraminal narrowing. Central canal patent. Moderate right neural foraminal narrowing.     L4-L5:  There is uncovering the intervertebral  disc space. There is bilateral facet hypertrophy. There is a right neural foraminal disc protrusion. Severe right neural foraminal/subarticular recess narrowing. Mild central canal narrowing. Moderate left   neural foraminal narrowing.     L5-S1: There is loss of disc height and signal. There is bilateral facet hypertrophy. There is a right neural foraminal disc protrusion. Mild right neural foraminal narrowing. Central canal patent. Mild left neural foraminal narrowing.     OTHER FINDINGS:  None.     IMPRESSION:        1. Multilevel spondylosis most pronounced at L3-4 and L4-5.  2. There is a transitional vertebral body at the lumbosacral junction.  If spinal intervention is indicated, correlation with radiography recommended.

## 2024-08-22 ENCOUNTER — PREP FOR PROCEDURE (OUTPATIENT)
Age: 59
End: 2024-08-22

## 2024-08-22 ENCOUNTER — TELEPHONE (OUTPATIENT)
Age: 59
End: 2024-08-22

## 2024-08-22 DIAGNOSIS — Z12.11 SCREENING FOR COLON CANCER: Primary | ICD-10-CM

## 2024-08-22 NOTE — TELEPHONE ENCOUNTER
08/22/24  Screened by: Neetu Simental    Referring Provider [facility]    Pre- Screening:     There is no height or weight on file to calculate BMI.  Has patient been referred for a routine screening Colonoscopy? yes  Is the patient between 45-75 years old? yes      Previous Colonoscopy yes   If yes:    Date: 20 Years ago    Facility: [facility]    Reason: Screening        Does the patient want to see a Gastroenterologist prior to their procedure OR are they having any GI symptoms? no    Has the patient been hospitalized or had abdominal surgery in the past 6 months? no    Does the patient use supplemental oxygen? no    Does the patient take Coumadin, Lovenox, Plavix, Elliquis, Xarelto, or other blood thinning medication? no    Has the patient had a stroke, cardiac event, or stent placed in the past year? no        If patient is between 45yrs - 49yrs, please advise patient that we will have to confirm benefits & coverage with their insurance company for a routine screening colonoscopy.

## 2024-08-22 NOTE — TELEPHONE ENCOUNTER
Scheduled date of colonoscopy (as of today):  Physician performing colonoscopy:   Location of colonoscopy: [facility]  Bowel prep reviewed with patient: Neetu Simental  Instructions reviewed with patient by: Neetu Simental  Clearances: N/A    Miralax dulcolax prep sent via email

## 2024-08-28 ENCOUNTER — ANESTHESIA (OUTPATIENT)
Dept: ANESTHESIOLOGY | Facility: HOSPITAL | Age: 59
End: 2024-08-28

## 2024-08-28 ENCOUNTER — ANESTHESIA EVENT (OUTPATIENT)
Dept: ANESTHESIOLOGY | Facility: HOSPITAL | Age: 59
End: 2024-08-28

## 2024-08-29 DIAGNOSIS — R26.2 AMBULATORY DYSFUNCTION: ICD-10-CM

## 2024-08-29 DIAGNOSIS — M51.24 THORACIC DISC HERNIATION: Primary | ICD-10-CM

## 2024-09-10 ENCOUNTER — TELEPHONE (OUTPATIENT)
Dept: GASTROENTEROLOGY | Facility: MEDICAL CENTER | Age: 59
End: 2024-09-10

## 2024-09-12 ENCOUNTER — TELEPHONE (OUTPATIENT)
Dept: GASTROENTEROLOGY | Facility: MEDICAL CENTER | Age: 59
End: 2024-09-12

## 2024-09-12 ENCOUNTER — HOSPITAL ENCOUNTER (OUTPATIENT)
Dept: GASTROENTEROLOGY | Facility: MEDICAL CENTER | Age: 59
Setting detail: OUTPATIENT SURGERY
Discharge: HOME | End: 2024-09-12

## 2024-09-12 DIAGNOSIS — Z12.11 SCREENING FOR COLON CANCER: ICD-10-CM

## 2024-09-12 RX ORDER — SODIUM CHLORIDE 9 MG/ML
125 INJECTION, SOLUTION INTRAVENOUS CONTINUOUS
Status: CANCELLED | OUTPATIENT
Start: 2024-09-12

## 2024-09-12 RX ORDER — SODIUM CHLORIDE 9 MG/ML
125 INJECTION, SOLUTION INTRAVENOUS CONTINUOUS
Status: DISCONTINUED | OUTPATIENT
Start: 2024-09-12 | End: 2024-09-16 | Stop reason: HOSPADM

## 2024-09-12 NOTE — TELEPHONE ENCOUNTER
Procedure: Colonoscopy  Date: 10/07/2024  Physician performing: Dr. London  Location of procedure:  [facility]  Instructions given to patient: Miralax  Diabetic: No  Clearances: N/A

## 2024-09-13 ENCOUNTER — TELEPHONE (OUTPATIENT)
Age: 59
End: 2024-09-13

## 2024-09-13 DIAGNOSIS — M48.04 THORACIC SPINAL STENOSIS: Primary | ICD-10-CM

## 2024-09-13 DIAGNOSIS — M47.14 THORACIC MYELOPATHY: ICD-10-CM

## 2024-09-13 NOTE — TELEPHONE ENCOUNTER
S/w pt and advised of same.Aware must have at least one session er week for 6 weeks.  Pt verbalized understanding and appreciation.

## 2024-09-13 NOTE — TELEPHONE ENCOUNTER
Will Terri Winter MD  [facility] hour ago (12:41 PM)       PT order placed

## 2024-09-13 NOTE — TELEPHONE ENCOUNTER
S/w pt, his MRI t-spine was denied due to no PT. Pt willing to complete if we can order. Pt cx'd Dr. Holley's appt due to no imaging.

## 2024-09-13 NOTE — TELEPHONE ENCOUNTER
Caller: Mu     Doctor: Maggy     Reason for call: Patient calling asking next steps since MRI was denied please advise     Call back#: [phone]

## 2024-09-17 NOTE — QUICK NOTE
Late entry. Colonoscopy was cancelled because the patient did not complete the colonoscopy preparation.

## 2024-09-25 ENCOUNTER — ANESTHESIA EVENT (OUTPATIENT)
Dept: ANESTHESIOLOGY | Facility: HOSPITAL | Age: 59
End: 2024-09-25

## 2024-09-25 ENCOUNTER — ANESTHESIA (OUTPATIENT)
Dept: ANESTHESIOLOGY | Facility: HOSPITAL | Age: 59
End: 2024-09-25

## 2024-10-01 ENCOUNTER — TELEPHONE (OUTPATIENT)
Age: 59
End: 2024-10-01

## 2024-10-01 NOTE — TELEPHONE ENCOUNTER
Confirming Upcoming Procedure: colonoscopy on 10/09/2024  Physician performing: Dr alexander  [facility]   Prep: miralax prep     Patient did not have any question at the moment

## 2024-10-04 NOTE — TELEPHONE ENCOUNTER
Spoke with patient, explain dr alexander has a emergency and dr cardenas will cover for him, patient okay with dr cardenas doing procedure

## 2024-10-09 ENCOUNTER — ANESTHESIA EVENT (OUTPATIENT)
Dept: GASTROENTEROLOGY | Facility: MEDICAL CENTER | Age: 59
End: 2024-10-09
Payer: COMMERCIAL

## 2024-10-09 ENCOUNTER — ANESTHESIA (OUTPATIENT)
Dept: GASTROENTEROLOGY | Facility: MEDICAL CENTER | Age: 59
End: 2024-10-09
Payer: COMMERCIAL

## 2024-10-09 ENCOUNTER — HOSPITAL ENCOUNTER (OUTPATIENT)
Dept: GASTROENTEROLOGY | Facility: MEDICAL CENTER | Age: 59
Setting detail: OUTPATIENT SURGERY
Discharge: HOME | End: 2024-10-09
Payer: COMMERCIAL

## 2024-10-09 VITALS
TEMPERATURE: 97.4 F | SYSTOLIC BLOOD PRESSURE: 111 MMHG | RESPIRATION RATE: 17 BRPM | OXYGEN SATURATION: 99 % | HEART RATE: 58 BPM | DIASTOLIC BLOOD PRESSURE: 74 MMHG

## 2024-10-09 PROCEDURE — 88305 TISSUE EXAM BY PATHOLOGIST: CPT | Performed by: STUDENT IN AN ORGANIZED HEALTH CARE EDUCATION/TRAINING PROGRAM

## 2024-10-09 PROCEDURE — 88342 IMHCHEM/IMCYTCHM 1ST ANTB: CPT | Performed by: STUDENT IN AN ORGANIZED HEALTH CARE EDUCATION/TRAINING PROGRAM

## 2024-10-09 PROCEDURE — 88341 IMHCHEM/IMCYTCHM EA ADD ANTB: CPT | Performed by: STUDENT IN AN ORGANIZED HEALTH CARE EDUCATION/TRAINING PROGRAM

## 2024-10-09 RX ORDER — SODIUM CHLORIDE 9 MG/ML
75 INJECTION, SOLUTION INTRAVENOUS CONTINUOUS
Status: DISCONTINUED | OUTPATIENT
Start: 2024-10-09 | End: 2024-10-09

## 2024-10-09 RX ORDER — SODIUM CHLORIDE 9 MG/ML
75 INJECTION, SOLUTION INTRAVENOUS CONTINUOUS
Status: CANCELLED | OUTPATIENT
Start: 2024-10-09

## 2024-10-09 RX ORDER — LIDOCAINE HYDROCHLORIDE 20 MG/ML
INJECTION, SOLUTION EPIDURAL; INFILTRATION; INTRACAUDAL; PERINEURAL AS NEEDED
Status: DISCONTINUED | OUTPATIENT
Start: 2024-10-09 | End: 2024-10-09

## 2024-10-09 RX ORDER — PROPOFOL 10 MG/ML
INJECTION, EMULSION INTRAVENOUS AS NEEDED
Status: DISCONTINUED | OUTPATIENT
Start: 2024-10-09 | End: 2024-10-09

## 2024-10-09 RX ADMIN — SODIUM CHLORIDE 75 ML/HR: 0.9 INJECTION, SOLUTION INTRAVENOUS at 09:31

## 2024-10-09 RX ADMIN — Medication 40 MG: at 09:58

## 2024-10-09 RX ADMIN — PROPOFOL 50 MG: 10 INJECTION, EMULSION INTRAVENOUS at 10:00

## 2024-10-09 RX ADMIN — PROPOFOL 50 MG: 10 INJECTION, EMULSION INTRAVENOUS at 10:03

## 2024-10-09 RX ADMIN — PROPOFOL 30 MG: 10 INJECTION, EMULSION INTRAVENOUS at 09:55

## 2024-10-09 RX ADMIN — PROPOFOL 20 MG: 10 INJECTION, EMULSION INTRAVENOUS at 10:05

## 2024-10-09 RX ADMIN — PROPOFOL 20 MG: 10 INJECTION, EMULSION INTRAVENOUS at 09:57

## 2024-10-09 RX ADMIN — LIDOCAINE HYDROCHLORIDE 100 MG: 20 INJECTION, SOLUTION EPIDURAL; INFILTRATION; INTRACAUDAL at 09:49

## 2024-10-09 RX ADMIN — PROPOFOL 100 MG: 10 INJECTION, EMULSION INTRAVENOUS at 09:49

## 2024-10-09 RX ADMIN — PROPOFOL 50 MG: 10 INJECTION, EMULSION INTRAVENOUS at 09:52

## 2024-10-09 NOTE — H&P
History and Physical - [facility]  Mu Gruber 59 y.o. male MRN: [id number]                  HPI: Mu Gruber is a 59 y.o. year old male who presents for colon cancer screening.      REVIEW OF SYSTEMS: Per the HPI, and otherwise unremarkable.    Historical Information   Past Medical History:   Diagnosis Date    Colon polyp     GERD (gastroesophageal reflux disease)     Neuropathy      Past Surgical History:   Procedure Laterality Date    COLONOSCOPY      [place] NEUROPLASTY &/TRANSPOS MEDIAN NRV CARPAL TUNNE Left 1/23/2024    Procedure: CTR, LEFT;  Surgeon: Herrera Smith MD;  [facility];  Service: Orthopedics    [place] NEUROPLASTY &/TRANSPOSITION ULNAR NERVE ELBOW Left 1/23/2024    Procedure: RELEASE CUBITAL TUNNEL, LEFT;  Surgeon: Herrera Smith MD;  [facility];  Service: Orthopedics     Social History   Social History     Substance and Sexual Activity   Alcohol Use Yes    Alcohol/week: 12.0 standard drinks of alcohol    Types: 12 Shots of liquor per week    Comment: occasional     Social History     Substance and Sexual Activity   Drug Use Yes    Types: Marijuana    Comment: smoking     Social History     Tobacco Use   Smoking Status Some Days    Current packs/day: 0.25    Average packs/day: 0.4 packs/day for 20.0 years (7.5 ttl pk-yrs)    Types: Cigarettes   Smokeless Tobacco Never     Family History   Problem Relation Age of Onset    Cancer Father     Cancer Sister        Meds/Allergies       Current Outpatient Medications:     gabapentin (NEURONTIN) 300 mg capsule    glucosamine-chondroitin 500-400 MG tablet    ibuprofen (MOTRIN) 200 mg tablet    multivitamin (THERAGRAN) TABS    omeprazole (PriLOSEC OTC) 20 MG tablet    Valerian Root 250 MG CAPS    albuterol (Proventil HFA) 90 mcg/act inhaler    lactase (LACTAID) 3,000 units tablet    Current Facility-Administered Medications:     sodium chloride 0.9 % infusion, 75 mL/hr, Intravenous, Continuous, Continue from Pre-op at 10/09/24  0940    Allergies   Allergen Reactions    Other Other (See Comments)     Pt reports sensitivity to bleach       Objective     /87   Pulse (!) 47   Temp (!) 97.4 °F (36.3 °C) (Temporal)   Resp 16   SpO2 99%       PHYSICAL EXAM    Gen: NAD  Head: NCAT  CV: RRR  CHEST: Clear  ABD: soft, NT/ND  EXT: no edema      ASSESSMENT/PLAN:  This is a 59 y.o. year old male here for colonoscopy, and he is stable and optimized for his procedure.

## 2024-10-09 NOTE — ANESTHESIA PREPROCEDURE EVALUATION
Procedure:  COLONOSCOPY    Relevant Problems   CARDIO   (+) Rib pain on left side      NEURO/PSYCH   (+) Left hand paresthesia      PULMONARY   (+) Smoking        Physical Exam    Airway    Mallampati score: III  TM Distance: >3 FB  Neck ROM: full     Dental   No notable dental hx     Cardiovascular  Cardiovascular exam normal    Pulmonary  Pulmonary exam normal     Other Findings        Anesthesia Plan  ASA Score- 2     Anesthesia Type- IV sedation with anesthesia with ASA Monitors.         Additional Monitors:     Airway Plan:            Plan Factors-Exercise tolerance (METS): >4 METS.    Chart reviewed.    Patient summary reviewed.    Patient is a current smoker.  Patient did not smoke on day of surgery.            Induction- intravenous.    Postoperative Plan-         Informed Consent- Anesthetic plan and risks discussed with patient.

## 2024-10-09 NOTE — ANESTHESIA POSTPROCEDURE EVALUATION
Post-Op Assessment Note    CV Status:  Stable    Pain management: adequate       Mental Status:  Alert and awake   Hydration Status:  Euvolemic   PONV Controlled:  Controlled   Airway Patency:  Patent     Post Op Vitals Reviewed: Yes    No anethesia notable event occurred.    Staff: CRNA           Last Filed PACU Vitals:  Vitals Value Taken Time   Temp     Pulse 59    /60    Resp 16    SpO2 99%        Modified Morena:  Activity: 2 (10/9/2024  9:17 AM)  Respiration: 2 (10/9/2024  9:17 AM)  Circulation: 2 (10/9/2024  9:17 AM)  Consciousness: 2 (10/9/2024  9:17 AM)  Oxygen Saturation: 2 (10/9/2024  9:17 AM)  Modified Morena Score: 10 (10/9/2024  9:17 AM)

## 2024-10-09 NOTE — ANESTHESIA POSTPROCEDURE EVALUATION
Post-Op Assessment Note    Last Filed PACU Vitals:  Vitals Value Taken Time   Temp     Pulse 58 10/09/24 1022   /74 10/09/24 1022   Resp 17 10/09/24 1022   SpO2 99 % 10/09/24 1022       Modified Morena:  Activity: 2 (10/9/2024 10:23 AM)  Respiration: 2 (10/9/2024 10:23 AM)  Circulation: 2 (10/9/2024 10:23 AM)  Consciousness: 2 (10/9/2024 10:23 AM)  Oxygen Saturation: 2 (10/9/2024 10:23 AM)  Modified Morena Score: 10 (10/9/2024 10:23 AM)

## 2024-10-11 ENCOUNTER — EVALUATION (OUTPATIENT)
Dept: PHYSICAL THERAPY | Facility: REHABILITATION | Age: 59
End: 2024-10-11
Payer: COMMERCIAL

## 2024-10-11 DIAGNOSIS — M48.04 THORACIC SPINAL STENOSIS: Primary | ICD-10-CM

## 2024-10-11 DIAGNOSIS — M47.14 THORACIC MYELOPATHY: ICD-10-CM

## 2024-10-11 PROCEDURE — 97110 THERAPEUTIC EXERCISES: CPT | Performed by: PHYSICAL THERAPIST

## 2024-10-11 PROCEDURE — 97161 PT EVAL LOW COMPLEX 20 MIN: CPT | Performed by: PHYSICAL THERAPIST

## 2024-10-11 NOTE — PROGRESS NOTES
PT Evaluation     Today's date: 10/11/2024  Patient name: Mu Gruber  : 1965  MRN: [id number]  Referring provider: Ruben Winter MD  Dx:   Encounter Diagnosis     ICD-10-CM    1. Thoracic spinal stenosis  M48.04 Ambulatory referral to Physical Therapy      2. Thoracic myelopathy  M47.14 Ambulatory referral to Physical Therapy                     Assessment  Impairments: abnormal coordination, abnormal or restricted ROM, activity intolerance, impaired physical strength and pain with function    Assessment details: Pt is a pleasant 59 y.o. male presenting to outpatient physical therapy with No diagnosis found..     Pt presents with minimal impairments with regards to lumbar AROM, LE strength, and with palpation. Demonstrates decreased LE sensation with light touch assessment to bilateral LE. Patient's presentation of s/sx's suggestive of likely adverse neurogenic claudication. Issued HEP. Pt is a good candidate for outpatient physical therapy and would benefit from skilled physical therapy to address limitations and to achieve goals. Thank you for this referral.   Understanding of Dx/Px/POC: good     Prognosis: good    Goals  Short-Term Goals (4 weeks)   1. Patient will decrease worst rating of pain by 25% to improve quality of life.  2. Patient will increase strength by 1/2 MMT to improve quality of life with improved efficiency of daily activities.  3. Patient will improve ROM by 25% indicating improved mobility of affected area.    Long-Term Goals (8 weeks)   1. Patient will decrease pain by 50% at worst in comparison to IE indicating significant reduction in pain and improved quality of life.  2. Patient will demonstrate strength WFL compared to IE levels indicating ability to independently manage pain symptoms to accomplish daily activities.   3. Patient will be independent with HEP with good form accomplished.      Plan  Patient would benefit from: PT eval and skilled PT  Planned modality  interventions: cryotherapy and thermotherapy: hydrocollator packs    Planned therapy interventions: IADL retraining, body mechanics training, flexibility, functional ROM exercises, home exercise program, neuromuscular re-education, manual therapy, postural training, strengthening, stretching, therapeutic activities, therapeutic exercise, joint mobilization and IASTM    Frequency: 1x week  Duration in weeks: 8  Treatment plan discussed with: patient        Subjective Evaluation    History of Present Illness  Mechanism of injury: 10/11/24  Pt comes to therapy reporting approx 1 year history of low back pain and bilateral LE numbness of unknown etiology and insidious onset. Notes he had an issue at the area of L4 approx 3 years ago, which was remedied with PT treatments. Notes he has been seeing his pain management physician for this condition, noting he has had an MRI of his low back. However, states his physician would like to obtain an MRI of his thoracic spine, but needs to complete PT requirements first.     AGGS: standing/walking at work ([employer]) for 8-10 hours  LOC: bilateral lower extremities from knees to feet, all aspects of lower leg; numbness  EASES: Reports he has seen some improvements since starting a course of Gabapentin medication.  Pt denies bowel or bladder dysfunction (incontinence or retention), saddle anesthesia, fever, chills, nausea, or vomiting. Pt also denies pain at night or recent unexplained weight loss.  GOALS: relieve numbness    Pain  Current pain ratin          Objective     Neurological Testing     Sensation     Lumbar   Left   Diminished: light touch    Right   Diminished: light touch    Reflexes   Left   Patellar (L4): brisk (3+)  Achilles (S1): normal (2+)  Clonus sign: negative    Right   Patellar (L4): brisk (3+)  Achilles (S1): normal (2+)  Clonus sign: negative    Additional Neurological Details  10/11/24  LOWER EXTREMITY MYOTOMES: Symmetrical, but diminished  LOWER  "EXTREMITY DERMATOMES: WNL, symmetrical, and intact L2-S2      Active Range of Motion     Lumbar   Flexion:  WFL  Extension:  WFL  Left lateral flexion:  WFL  Right lateral flexion:  WFL  Left rotation:  WFL  Right rotation:  WFL    Joint Play   Joints within functional limits: T12, L1, L2 and L3     Hypomobile: T8, T9, T10, T11, L4 and L5     Tests     Lumbar     Left   Negative passive SLR.     Right   Negative passive SLR.     Left Hip   Negative CORINNA.     Right Hip   Negative CORINNA.     Additional Tests Details  10/11/24  Denies TTP along PSMs, glutes    General Comments:    Lower quarter screen   Hips: unremarkable  Knees: unremarkable             Precautions:   Patient Active Problem List   Diagnosis    Left hand paresthesia    Rib pain on left side    Lesion of lung    Smoking      Allergies   Allergen Reactions    Other Other (See Comments)     Pt reports sensitivity to bleach       Daily Treatment Diary    Date 10/11/24             FOTO nv            Re-Eval IE            Auth visit # 1            Manuals                                                        Neuro Re-Ed     H/L dead bugs                                                                                           Ther Ex    LTR demo            90/90 nn glides             Open books demo            Child's pose 20\"x5            Quad thread the needle                           Pball roll outs             Seated HS stretch                          Leg press                          Ther Activity    Bike                           Gait Training                              Modalities                                       "

## 2024-10-16 ENCOUNTER — OFFICE VISIT (OUTPATIENT)
Dept: PHYSICAL THERAPY | Facility: REHABILITATION | Age: 59
End: 2024-10-16
Payer: COMMERCIAL

## 2024-10-16 DIAGNOSIS — M47.14 THORACIC MYELOPATHY: ICD-10-CM

## 2024-10-16 DIAGNOSIS — M48.04 THORACIC SPINAL STENOSIS: Primary | ICD-10-CM

## 2024-10-16 PROCEDURE — 97110 THERAPEUTIC EXERCISES: CPT

## 2024-10-16 PROCEDURE — 97530 THERAPEUTIC ACTIVITIES: CPT

## 2024-10-16 PROCEDURE — 97112 NEUROMUSCULAR REEDUCATION: CPT

## 2024-10-16 NOTE — PROGRESS NOTES
"Daily Note     Today's date: 10/16/2024  Patient name: Mu Gruber  : 1965  MRN: [id number]  Referring provider: Ruben Winter MD  Dx:   Encounter Diagnosis     ICD-10-CM    1. Thoracic spinal stenosis  M48.04       2. Thoracic myelopathy  M47.14                      Subjective: pt reports with c/o numbness in B LE's which makes it difficult to walk/stand for longer periods of time. He reports compliance with HEP with relief doing so.      Objective: See treatment diary below      Assessment: Pt tolerated treatment well. Good response with addition of exercises with no adverse reactions. Decreased tightness in back post exercises. Pt instructed to continue with current HEP and add 90/90 nerve glides to program. Pt educatued on possible DOMS with addition of new mobility/flexibility exercises this visit. Patient demonstrated fatigue post treatment, exhibited good technique with therapeutic exercises, and would benefit from continued PT      Plan: Continue per plan of care.  Progress treatment as tolerated.       Precautions:   Patient Active Problem List   Diagnosis    Left hand paresthesia    Rib pain on left side    Lesion of lung    Smoking      Allergies   Allergen Reactions    Other Other (See Comments)     Pt reports sensitivity to bleach       Daily Treatment Diary    Date 10/11/24  10/16           FOTO nv perf           Re-Eval IE            Auth visit # 1 2           Manuals                                                        Neuro Re-Ed     H/L dead bugs  nv                                                                                         Ther Ex    LTR demo 15\"x5            90/90 nn glides  2x10 B           Open books demo 5\"x10 ea B           Child's pose 20\"x5 20\"x5            Quad thread the needle   5\"x10 ea B                        Pball roll outs  10\"x5 ea           Seated HS stretch  30\"x3 B                        Leg press                          Ther Activity    Bike   L1x5'   "                      Gait Training                              Modalities

## 2024-10-17 PROCEDURE — 88342 IMHCHEM/IMCYTCHM 1ST ANTB: CPT | Performed by: STUDENT IN AN ORGANIZED HEALTH CARE EDUCATION/TRAINING PROGRAM

## 2024-10-17 PROCEDURE — 88305 TISSUE EXAM BY PATHOLOGIST: CPT | Performed by: STUDENT IN AN ORGANIZED HEALTH CARE EDUCATION/TRAINING PROGRAM

## 2024-10-17 PROCEDURE — 88341 IMHCHEM/IMCYTCHM EA ADD ANTB: CPT | Performed by: STUDENT IN AN ORGANIZED HEALTH CARE EDUCATION/TRAINING PROGRAM

## 2024-10-17 NOTE — RESULT ENCOUNTER NOTE
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 This was communicated via "[employer].

## 2024-10-24 ENCOUNTER — OFFICE VISIT (OUTPATIENT)
Dept: PHYSICAL THERAPY | Facility: REHABILITATION | Age: 59
End: 2024-10-24
Payer: COMMERCIAL

## 2024-10-24 DIAGNOSIS — M48.04 THORACIC SPINAL STENOSIS: Primary | ICD-10-CM

## 2024-10-24 DIAGNOSIS — M47.14 THORACIC MYELOPATHY: ICD-10-CM

## 2024-10-24 PROCEDURE — 97110 THERAPEUTIC EXERCISES: CPT | Performed by: PHYSICAL THERAPIST

## 2024-10-24 PROCEDURE — 97112 NEUROMUSCULAR REEDUCATION: CPT | Performed by: PHYSICAL THERAPIST

## 2024-10-24 NOTE — PROGRESS NOTES
"Daily Note     Today's date: 10/24/2024  Patient name: Mu Gruber  : 1965  MRN: [id number]  Referring provider: Ruben Winter MD  Dx:   Encounter Diagnosis     ICD-10-CM    1. Thoracic spinal stenosis  M48.04       2. Thoracic myelopathy  M47.14                      Subjective: Pt comes to therapy noting feeling stiff today, attributed to coming to PT straight from work. Reports his home exercises have been effective and finds them useful upon getting home       Objective: See treatment diary below      Assessment: Tolerated treatment well. Appropriate stretches reported with mobility exercises. Good challenge with addition of leg press. Patient exhibited good technique with therapeutic exercises and would benefit from continued PT      Plan: Progress treatment as tolerated.       Precautions:   Patient Active Problem List   Diagnosis    Left hand paresthesia    Rib pain on left side    Lesion of lung    Smoking      Allergies   Allergen Reactions    Other Other (See Comments)     Pt reports sensitivity to bleach       Daily Treatment Diary    Date 10/11/24  10/16 10/24          FOTO nv perf           Re-Eval IE            Auth visit # 1 2           Manuals                                                        Neuro Re-Ed     H/L dead bugs c pball  nv 5\" 2x5 B                                                                                        Ther Ex    LTR demo 15\"x5  15\"x5 B          90/90 nn glides  2x10 B 3x10 B          Open books demo 5\"x10 ea B 15\"x5 B          Child's pose 20\"x5 20\"x5            Quad thread the needle   5\"x10 ea B 5\"x10 B                       Pball roll outs  10\"x5 ea 15\"x5 ea          Seated HS stretch  30\"x3 B 30\"x3 B                       Leg press   66# 2x10                       Ther Activity    Bike   L1x5' L1x5'                       Gait Training                              Modalities                                         "

## 2024-10-25 DIAGNOSIS — M79.2 NEUROPATHIC PAIN: ICD-10-CM

## 2024-10-25 DIAGNOSIS — M54.16 RADICULOPATHY, LUMBAR REGION: ICD-10-CM

## 2024-10-25 NOTE — TELEPHONE ENCOUNTER
Reason for call:   [x] Refill   [] Prior Auth  [] Other:     Office:   [] PCP/Provider -   [x] Specialty/Provider - Spine and Pain    Medication: gabapentin (NEURONTIN) 300 mg capsule     Dose/Frequency:  Take 1 capsule (300 mg total) by mouth 3 (three) times a day,     Quantity: 90    Pharmacy: [id number] - [place] - [address]      Does the patient have enough for 3 days?   [] Yes   [x] No - Send as HP to POD

## 2024-10-27 DIAGNOSIS — M79.2 NEUROPATHIC PAIN: ICD-10-CM

## 2024-10-27 DIAGNOSIS — M54.16 RADICULOPATHY, LUMBAR REGION: ICD-10-CM

## 2024-10-28 RX ORDER — GABAPENTIN 400 MG/1
400 CAPSULE ORAL 3 TIMES DAILY
Qty: 90 CAPSULE | Refills: 2 | Status: SHIPPED | OUTPATIENT
Start: 2024-10-28

## 2024-10-29 RX ORDER — GABAPENTIN 300 MG/1
300 CAPSULE ORAL 3 TIMES DAILY
Qty: 90 CAPSULE | Refills: 5 | OUTPATIENT
Start: 2024-10-29

## 2024-10-30 ENCOUNTER — OFFICE VISIT (OUTPATIENT)
Dept: PHYSICAL THERAPY | Facility: REHABILITATION | Age: 59
End: 2024-10-30
Payer: COMMERCIAL

## 2024-10-30 DIAGNOSIS — M47.14 THORACIC MYELOPATHY: ICD-10-CM

## 2024-10-30 DIAGNOSIS — M48.04 THORACIC SPINAL STENOSIS: Primary | ICD-10-CM

## 2024-10-30 PROCEDURE — 97110 THERAPEUTIC EXERCISES: CPT | Performed by: PHYSICAL THERAPIST

## 2024-10-30 PROCEDURE — 97112 NEUROMUSCULAR REEDUCATION: CPT | Performed by: PHYSICAL THERAPIST

## 2024-10-30 NOTE — PROGRESS NOTES
"Daily Note     Today's date: 10/30/2024  Patient name: Mu Gruber  : 1965  MRN: [id number]  Referring provider: Ruben Winter MD  Dx:   Encounter Diagnosis     ICD-10-CM    1. Thoracic spinal stenosis  M48.04       2. Thoracic myelopathy  M47.14                      Subjective: Pt comes to therapy noting the exercises have been helpful at reducing stiffness in his back. Notes his back is typically the most stiff following work. States he came from work to therapy today, and therefore, is having some stiffness in his back.      Objective: See treatment diary below      Assessment: Tolerated treatment well. Minor cuing for form, but otherwise, demonstrates good control with newly added trunk stab exercises. Reported feeling less tension in his back at the end of the session. Patient exhibited good technique with therapeutic exercises and would benefit from continued PT      Plan: Potential discharge next visit. Progress treatment as tolerated.       Precautions:   Patient Active Problem List   Diagnosis    Left hand paresthesia    Rib pain on left side    Lesion of lung    Smoking      Allergies   Allergen Reactions    Other Other (See Comments)     Pt reports sensitivity to bleach       Daily Treatment Diary    Date 10/11/24  10/16 10/24 10/30         FOTO nv perf           Re-Eval IE            Auth visit # 1 2 3 4         Manuals                                                        Neuro Re-Ed     H/L dead bugs c pball  nv 5\" 2x5 B 5\" 2x5 B         Bridges c TrA    5\" x10         Bird dogs    5\" 2x5 B                                                             Ther Ex    LTR demo 15\"x5  15\"x5 B          90/90 nn glides  2x10 B 3x10 B 3x10 B         Open books demo 5\"x10 ea B 15\"x5 B 15\"x5 B         Child's pose 20\"x5 20\"x5            Quad thread the needle   5\"x10 ea B 5\"x10 B 5\"x10 B                      Pball roll outs  10\"x5 ea 15\"x5 ea 15\"x5 ea         Seated HS stretch  30\"x3 B 30\"x3 B " "90/90  10\"x5 B                      Leg press   66# 2x10 66# 2x10                      Ther Activity    Bike   L1x5' L1x5' L1x5'                      Gait Training                              Modalities                                Access Code: [email or web address]  URL: [email or web address]  Date: 10/30/2024  Prepared by: Jcarlos Anderson    Exercises  - Supine 90/90 Lower Trunk Rotation  - 2 x daily - 5 reps - 20 hold  - Supine Bridge with TRa bracing  - 1 x daily - 10 reps - 5 hold  - Supine Dead Bug with Leg Extension  - 1 x daily - 2 sets - 5 reps - 5 hold  - Supine Hamstring Stretch  - 1 x daily - 5 reps - 10 hold  - Bird Dog  - 1 x daily - 2 sets - 5 reps - 5 hold  - Quadruped Full Range Thoracic Rotation with Reach  - 1 x daily - 10 reps - 5 hold  - Sidelying Thoracic Rotation with Open Book  - 1 x daily - 10 reps - 5 hold     "

## 2024-11-06 ENCOUNTER — APPOINTMENT (OUTPATIENT)
Dept: PHYSICAL THERAPY | Facility: REHABILITATION | Age: 59
End: 2024-11-06
Payer: COMMERCIAL

## 2024-11-11 ENCOUNTER — PROCEDURE VISIT (OUTPATIENT)
Dept: NEUROLOGY | Facility: CLINIC | Age: 59
End: 2024-11-11
Payer: COMMERCIAL

## 2024-11-11 DIAGNOSIS — R20.2 PARESTHESIA OF LOWER EXTREMITY: ICD-10-CM

## 2024-11-11 PROCEDURE — 95911 NRV CNDJ TEST 9-10 STUDIES: CPT | Performed by: PHYSICAL MEDICINE & REHABILITATION

## 2024-11-11 PROCEDURE — 95886 MUSC TEST DONE W/N TEST COMP: CPT | Performed by: PHYSICAL MEDICINE & REHABILITATION

## 2024-11-13 ENCOUNTER — OFFICE VISIT (OUTPATIENT)
Dept: PHYSICAL THERAPY | Facility: REHABILITATION | Age: 59
End: 2024-11-13
Payer: COMMERCIAL

## 2024-11-13 DIAGNOSIS — M47.14 THORACIC MYELOPATHY: ICD-10-CM

## 2024-11-13 DIAGNOSIS — M48.04 THORACIC SPINAL STENOSIS: Primary | ICD-10-CM

## 2024-11-13 PROCEDURE — 97110 THERAPEUTIC EXERCISES: CPT | Performed by: PHYSICAL THERAPIST

## 2024-11-13 NOTE — PROGRESS NOTES
"Daily Note     Today's date: 2024  Patient name: Mu Gruber  : 1965  MRN: [id number]  Referring provider: Ruben Winter MD  Dx:   Encounter Diagnosis     ICD-10-CM    1. Thoracic spinal stenosis  M48.04       2. Thoracic myelopathy  M47.14                      Subjective: Pt notes he finds the home exercises helpful and reduces the stiffness in his back, however, states effects are temporary and symptoms return a few hours later. States he continues to feel numbness in his thighs with prolonged sitting, as well as intermittent tightness in his legs and feet.       Objective: See treatment diary below      Assessment: Tolerated treatment well. Reported appropriate stretch and favorable responses to newly added exercises, particularly EOT mobility activities. Reported feeling less tension and feeling looser at the end of the session. Patient exhibited good technique with therapeutic exercises and would benefit from continued PT      Plan: Progress treatment as tolerated.       No Authorization Required   Billing Guidelines  CMS   Copay/Co-Insurance  0   Visit Limit  30v pcy     Precautions:   Patient Active Problem List   Diagnosis    Left hand paresthesia    Rib pain on left side    Lesion of lung    Smoking      Allergies   Allergen Reactions    Other Other (See Comments)     Pt reports sensitivity to bleach       Daily Treatment Diary    Date 10/11/24  10/16 10/24 10/30 11/13        FOTO nv perf           Re-Eval IE            Auth visit # 1 2 3 4 5        Manuals                                                        Neuro Re-Ed     H/L dead bugs c pball  nv 5\" 2x5 B 5\" 2x5 B 5\" 2x5 B        Bridges c TrA    5\" x10         Bird dogs    5\" 2x5 B                                                             Ther Ex    LTR demo 15\"x5  15\"x5 B          90/90 nn glides  2x10 B 3x10 B 3x10 B 1x10 B        EOT hip flexor/femoral nn glides     3x10 B        EOT QL stretch     5\"x10 B        Open books demo " "5\"x10 ea B 15\"x5 B 15\"x5 B 5\"x10 B        Child's pose 20\"x5 20\"x5            Quad thread the needle   5\"x10 ea B 5\"x10 B 5\"x10 B 5\" 3x10 B                     Pball roll outs  10\"x5 ea 15\"x5 ea 15\"x5 ea         Seated HS stretch  30\"x3 B 30\"x3 B 90/90  10\"x5 B                      Leg press   66# 2x10 66# 2x10 88# 2x10                     Ther Activity    Bike   L1x5' L1x5' L1x5' L1x5'                     Gait Training                              Modalities                                Access Code: [email or web address]  URL: [email or web address]  Date: 11/13/2024  Prepared by: Jcarlos Anderson    Exercises  - Supine 90/90 Lower Trunk Rotation  - 2 x daily - 5 reps - 20 hold  - Supine Dead Bug with Leg Extension  - 1 x daily - 2 sets - 5 reps - 5 hold  - Supine Hamstring Stretch  - 1 x daily - 5 reps - 10 hold  - Quadruped Full Range Thoracic Rotation with Reach  - 1 x daily - 10 reps - 5 hold  - Sidelying Thoracic Rotation with Open Book  - 1 x daily - 10 reps - 5 hold  - Sidelying Quadratus Lumborum Stretch on Table  - 1 x daily - 5 reps - 10 hold  - Supine Dynamic Modified Lewis Quad and Hip Flexor Dynamic Stretch  - 1 x daily - 3 sets - 10 reps - 5 hold  - Prone Press Up  - 1 x daily - 3 sets - 10 reps - 5 hold       "

## 2024-11-20 ENCOUNTER — APPOINTMENT (OUTPATIENT)
Dept: PHYSICAL THERAPY | Facility: REHABILITATION | Age: 59
End: 2024-11-20
Payer: COMMERCIAL

## 2025-03-07 ENCOUNTER — OFFICE VISIT (OUTPATIENT)
Dept: URGENT CARE | Age: 60
End: 2025-03-07
Payer: COMMERCIAL

## 2025-03-07 ENCOUNTER — APPOINTMENT (OUTPATIENT)
Dept: RADIOLOGY | Age: 60
End: 2025-03-07
Payer: COMMERCIAL

## 2025-03-07 VITALS
RESPIRATION RATE: 18 BRPM | TEMPERATURE: 100.1 F | DIASTOLIC BLOOD PRESSURE: 74 MMHG | SYSTOLIC BLOOD PRESSURE: 102 MMHG | WEIGHT: 158 LBS | HEART RATE: 76 BPM | BODY MASS INDEX: 24.75 KG/M2 | OXYGEN SATURATION: 97 %

## 2025-03-07 DIAGNOSIS — J06.9 VIRAL URI WITH COUGH: Primary | ICD-10-CM

## 2025-03-07 PROCEDURE — 87636 SARSCOV2 & INF A&B AMP PRB: CPT | Performed by: EMERGENCY MEDICINE

## 2025-03-07 PROCEDURE — 71046 X-RAY EXAM CHEST 2 VIEWS: CPT

## 2025-03-07 PROCEDURE — 99203 OFFICE O/P NEW LOW 30 MIN: CPT | Performed by: EMERGENCY MEDICINE

## 2025-03-07 RX ORDER — IBUPROFEN 400 MG/1
400 TABLET, FILM COATED ORAL ONCE
Status: COMPLETED | OUTPATIENT
Start: 2025-03-07 | End: 2025-03-07

## 2025-03-07 RX ORDER — ALBUTEROL SULFATE 90 UG/1
2 INHALANT RESPIRATORY (INHALATION) EVERY 4 HOURS PRN
Qty: 8.5 G | Refills: 5 | Status: SHIPPED | OUTPATIENT
Start: 2025-03-07 | End: 2025-04-06

## 2025-03-07 RX ORDER — OSELTAMIVIR PHOSPHATE 75 MG/1
75 CAPSULE ORAL 2 TIMES DAILY
Qty: 10 CAPSULE | Refills: 0 | Status: SHIPPED | OUTPATIENT
Start: 2025-03-07 | End: 2025-03-12

## 2025-03-07 RX ORDER — ACETAMINOPHEN 325 MG/1
975 TABLET ORAL ONCE
Status: COMPLETED | OUTPATIENT
Start: 2025-03-07 | End: 2025-03-07

## 2025-03-07 RX ADMIN — ACETAMINOPHEN 975 MG: 325 TABLET ORAL at 11:55

## 2025-03-07 RX ADMIN — IBUPROFEN 400 MG: 400 TABLET, FILM COATED ORAL at 11:55

## 2025-03-07 NOTE — PROGRESS NOTES
[facility]        NAME: Mu Gruber is a 60 y.o. male  : 1965    MRN: [id number]  DATE: 2025  TIME: 12:30 PM    Assessment and Plan   Viral URI with cough [J06.9]  1. Viral URI with cough  Covid19 and INFLUENZA A/B PCR    albuterol (ProAir HFA) 90 mcg/act inhaler    oseltamivir (TAMIFLU) 75 mg capsule    acetaminophen (TYLENOL) tablet 975 mg    ibuprofen (MOTRIN) tablet 400 mg    XR chest pa and lateral        No focal infiltrate or consolidation noted on personal review of chest x-ray.  Patient otherwise well-appearing, hemodynamically stable without acute respiratory or other distress.  COVID-19/influenza PCR obtained in clinic, patient is given Tamiflu in the interim depending influenza results.  Advised patient to discontinue Tamiflu if influenza negative, will also give albuterol inhaler as this has helped in the past with similar symptoms.   Denies a formal history of COPD or asthma.  I did advise patient to continue supportive care and symptomatic treatment at home with over-the-counter medications, also advised to seek care in ED if symptoms significantly worsen, otherwise follow-up closely with PCP as directed.  All questions answered at bedside, patient was amenable to plan and voiced understanding.    Patient Instructions       Follow up with PCP in 3-5 days.  Proceed to  ER if symptoms worsen.    If tests have been performed at [facility], our office will contact you with results if changes need to be made to the care plan discussed with you at the visit.  [facility]    Chief Complaint     Chief Complaint   Patient presents with    Generalized Body Aches    Nasal Congestion     C/o of symptoms starting 2 days ago. Has been taking motrin and nightquil.          History of Present Illness       60-year-old male with history of GERD, neuropathy, current tobacco use with cigarettes presents with chief complaint of 2 days of generalized  bodyaches, malaise and fatigue.  He also endorses associated nasal congestion, sore throat and dry nonproductive cough.  Unsure of fevers at home.  He otherwise denies chest pain, shortness of breath, dyspnea on exertion, abdominal pain, nausea, vomiting.  He is endorsing some intermittent, watery nonbloody diarrhea.    Generalized Body Aches  The current episode started in the past 7 days. The problem occurs constantly. The problem has been waxing and waning since onset. The pain is moderate. The symptoms are aggravated by activity. Associated symptoms include congestion, rhinorrhea, a sore throat, a URI, fatigue, a fever, coughing, diarrhea and muscle aches. Pertinent negatives include no decreased vision, double vision, ear discharge, ear pain, eye discharge, eye itching, eye pain, eye redness, headaches, hearing loss, mouth sores, photophobia, stridor, swollen glands, weight gain, weight loss, chest pain, shortness of breath, wheezing, abdominal pain, constipation, nausea, vomiting, joint pain, joint swelling, neck pain, neck stiffness or rash. Past treatments include acetaminophen. The treatment provided mild relief.       Review of Systems   Review of Systems   Constitutional:  Positive for chills, fatigue and fever. Negative for activity change, appetite change, diaphoresis, unexpected weight change, weight gain and weight loss.   HENT:  Positive for congestion, postnasal drip, rhinorrhea and sore throat. Negative for dental problem, drooling, ear discharge, ear pain, facial swelling, hearing loss, mouth sores, nosebleeds, sinus pressure, sinus pain, sneezing, tinnitus, trouble swallowing and voice change.    Eyes:  Negative for double vision, photophobia, pain, discharge, redness, itching and visual disturbance.   Respiratory:  Positive for cough. Negative for apnea, choking, chest tightness, shortness of breath, wheezing and stridor.    Cardiovascular:  Negative for chest pain, palpitations and leg  swelling.   Gastrointestinal:  Positive for diarrhea. Negative for abdominal distention, abdominal pain, anal bleeding, blood in stool, constipation, nausea, rectal pain and vomiting.   Musculoskeletal:  Positive for myalgias. Negative for arthralgias, back pain, gait problem, joint pain, joint swelling, neck pain and neck stiffness.   Skin:  Negative for rash.   Neurological:  Negative for dizziness, tremors, seizures, syncope, facial asymmetry, speech difficulty, weakness, light-headedness, numbness and headaches.         Current Medications       Current Outpatient Medications:     albuterol (ProAir HFA) 90 mcg/act inhaler, Inhale 2 puffs every 4 (four) hours as needed for wheezing or shortness of breath, Disp: 8.5 g, Rfl: 5    gabapentin (NEURONTIN) 400 mg capsule, Take 1 capsule (400 mg total) by mouth 3 (three) times a day, Disp: 90 capsule, Rfl: 2    oseltamivir (TAMIFLU) 75 mg capsule, Take 1 capsule (75 mg total) by mouth 2 (two) times a day for 5 days, Disp: 10 capsule, Rfl: 0    albuterol (Proventil HFA) 90 mcg/act inhaler, Inhale 1-2 puffs every 6 (six) hours as needed for wheezing (Patient not taking: Reported on 8/21/2024), Disp: 6.7 g, Rfl: 0    glucosamine-chondroitin 500-400 MG tablet, Take 1 tablet by mouth 3 (three) times a day, Disp: , Rfl:     ibuprofen (MOTRIN) 200 mg tablet, Take by mouth every 6 (six) hours as needed for mild pain, Disp: , Rfl:     lactase (LACTAID) 3,000 units tablet, Take 3,000 Units by mouth if needed, Disp: , Rfl:     multivitamin (THERAGRAN) TABS, Take 1 tablet by mouth daily, Disp: , Rfl:     omeprazole (PriLOSEC OTC) 20 MG tablet, Take 20 mg by mouth if needed, Disp: , Rfl:     Valerian Root 250 MG CAPS, Take 250 mg by mouth, Disp: , Rfl:   No current facility-administered medications for this visit.    Current Allergies     Allergies as of 03/07/2025 - Reviewed 03/07/2025   Allergen Reaction Noted    Other Other (See Comments) 08/01/2024            The following  portions of the patient's history were reviewed and updated as appropriate: allergies, current medications, past family history, past medical history, past social history, past surgical history and problem list.     Past Medical History:   Diagnosis Date    Colon polyp     GERD (gastroesophageal reflux disease)     Neuropathy        Past Surgical History:   Procedure Laterality Date    COLONOSCOPY      MA NEUROPLASTY &/TRANSPOS MEDIAN NRV CARPAL TUNNE Left 1/23/2024    Procedure: CTR, LEFT;  Surgeon: Herrera Smith MD;  Location: [facility];  Service: Orthopedics    MA NEUROPLASTY &/TRANSPOSITION ULNAR NERVE ELBOW Left 1/23/2024    Procedure: RELEASE CUBITAL TUNNEL, LEFT;  Surgeon: Herrera Smith MD;  Location: [facility];  Service: Orthopedics       Family History   Problem Relation Age of Onset    Cancer Father     Cancer Sister          Medications have been verified.        Objective   /74   Pulse 76   Temp 100.1 °F (37.8 °C) (Tympanic)   Resp 18   Wt 71.7 kg (158 lb)   SpO2 97%   BMI 24.75 kg/m²   No LMP for male patient.       Physical Exam     Physical Exam  Vitals and nursing note reviewed.   Constitutional:       General: He is not in acute distress.     Appearance: Normal appearance. He is normal weight. He is not ill-appearing, toxic-appearing or diaphoretic.      Interventions: He is not intubated.  HENT:      Head: Normocephalic and atraumatic.      Right Ear: Tympanic membrane, ear canal and external ear normal. There is no impacted cerumen.      Left Ear: Tympanic membrane, ear canal and external ear normal. There is no impacted cerumen.      Nose: Congestion and rhinorrhea present.      Mouth/Throat:      Mouth: Mucous membranes are moist.      Pharynx: Oropharynx is clear. Posterior oropharyngeal erythema present. No oropharyngeal exudate.   Eyes:      General: No scleral icterus.        Right eye: No discharge.         Left eye: No discharge.      Extraocular Movements: Extraocular movements  intact.      Conjunctiva/sclera: Conjunctivae normal.      Pupils: Pupils are equal, round, and reactive to light.   Neck:      Vascular: No carotid bruit.   Cardiovascular:      Rate and Rhythm: Normal rate and regular rhythm.      Pulses: Normal pulses.      Heart sounds: Normal heart sounds. No murmur heard.     No friction rub. No gallop.   Pulmonary:      Effort: Pulmonary effort is normal. No tachypnea, bradypnea, accessory muscle usage, prolonged expiration, respiratory distress or retractions. He is not intubated.      Breath sounds: No stridor or decreased air movement. Examination of the right-middle field reveals rhonchi. Examination of the left-middle field reveals rhonchi. Examination of the right-lower field reveals rhonchi. Examination of the left-lower field reveals rhonchi. Rhonchi present. No wheezing or rales.   Chest:      Chest wall: No tenderness.   Abdominal:      General: Abdomen is flat. There is no distension.      Palpations: There is no mass.      Tenderness: There is no abdominal tenderness. There is no right CVA tenderness, left CVA tenderness, guarding or rebound.      Hernia: No hernia is present.   Musculoskeletal:         General: No swelling, tenderness, deformity or signs of injury.      Cervical back: Normal range of motion and neck supple. No rigidity or tenderness.      Right lower leg: No edema.      Left lower leg: No edema.   Lymphadenopathy:      Cervical: Cervical adenopathy present.   Skin:     General: Skin is warm and dry.      Capillary Refill: Capillary refill takes less than 2 seconds.   Neurological:      General: No focal deficit present.      Mental Status: He is alert and oriented to person, place, and time.   Psychiatric:         Mood and Affect: Mood normal.         Behavior: Behavior normal.

## 2025-03-07 NOTE — LETTER
March 7, 2025     Patient: Mu Gruber   YOB: 1965   Date of Visit: 3/7/2025       To Whom it May Concern:    Mu Gruber was seen in my clinic on 3/7/2025. He may return to school on 3/11/25 .Please excuse from 3/5 and 3/6 as well for an acute medical illness.     If you have any questions or concerns, please don't hesitate to call.         Sincerely,          Giuseppe Clayton,         CC: No Recipients

## 2025-03-08 LAB
FLUAV RNA RESP QL NAA+PROBE: POSITIVE
FLUBV RNA RESP QL NAA+PROBE: NEGATIVE
SARS-COV-2 RNA RESP QL NAA+PROBE: NEGATIVE

## 2025-03-09 ENCOUNTER — RESULTS FOLLOW-UP (OUTPATIENT)
Dept: URGENT CARE | Age: 60
End: 2025-03-09

## 2025-03-09 ENCOUNTER — HOSPITAL ENCOUNTER (OUTPATIENT)
Facility: MEDICAL CENTER | Age: 60
Discharge: HOME | End: 2025-03-09
Payer: COMMERCIAL

## 2025-03-09 DIAGNOSIS — M47.14 MYELOPATHY OF THORACIC REGION: ICD-10-CM

## 2025-03-09 PROCEDURE — 72146 MRI CHEST SPINE W/O DYE: CPT

## 2025-03-19 ENCOUNTER — OFFICE VISIT (OUTPATIENT)
Dept: PAIN MEDICINE | Facility: CLINIC | Age: 60
End: 2025-03-19
Payer: COMMERCIAL

## 2025-03-19 VITALS — HEIGHT: 67 IN | WEIGHT: 158 LBS | BODY MASS INDEX: 24.8 KG/M2

## 2025-03-19 DIAGNOSIS — M54.14 THORACIC RADICULITIS: ICD-10-CM

## 2025-03-19 DIAGNOSIS — M54.12 CERVICAL RADICULOPATHY: ICD-10-CM

## 2025-03-19 DIAGNOSIS — M54.16 LUMBAR RADICULOPATHY: Primary | ICD-10-CM

## 2025-03-19 PROCEDURE — 99215 OFFICE O/P EST HI 40 MIN: CPT | Performed by: ANESTHESIOLOGY

## 2025-03-19 RX ORDER — GABAPENTIN 600 MG/1
600 TABLET ORAL 3 TIMES DAILY
Qty: 90 TABLET | Refills: 2 | Status: SHIPPED | OUTPATIENT
Start: 2025-03-19

## 2025-03-19 NOTE — PROGRESS NOTES
Assessment:  1. Lumbar radiculopathy    2. Thoracic radiculitis    3. Cervical radiculopathy      Patient presenting for follow up visit regarding low back, mid back and neck pains with associated lumbar, thoracic and cervical radicular symptoms.    Symptoms accompanied by consistent moderate to severe pain on the pain scale (5+/10) with inability to participate in IADLs for >6 weeks. Patient has participated with physical therapy as well as home exercises and stretches.  Patient has tried gabapentin with modest benefit.    Denies any bowel or bladder incontinence, saddle anesthesia.    Independently reviewed and interpreted lumbar and thoracic MRIs - lumbar MRI showed multilevel degenerative changes from L3-S1 with severe right and moderate left foraminal narrowing at L4-5, severe left foraminal narrowing and moderate right foraminal narrowing at L3-4.    Thoracic MRI showed multilevel degenerative changes with moderate spinal stenosis at T10-11 with severe bilateral foraminal stenosis and mild cord flattening at T9-10 without cord signal and moderate to severe bilateral foraminal narrowing at T8-9.    Patient also obtain an EMG which was personally reviewed-this showed chronic right L4-5 and bilateral L5-S1 radiculopathies.    Plan:    Will schedule the patient for bilateral L4 transforaminal ESIs followed by T10-11 thoracic GAGE for his severe chronic lumbar and thoracic radiculopathies, respectively.  The procedures, its risks, and benefits were explained in detail to the patient. Risks include but are not limited to bleeding, infection, hematoma formation, abscess formation, weakness, headache, failure the pain to improve, nerve irritation or damage, and potential worsening of the pain.  The approach was demonstrated using models and literature was provided. The patient verbalized understanding and wished to proceed with the procedure.     He does  to note some benefit with gabapentin-will increase his dosage to  600 mg 3 times daily.    Will obtain cervical MRI for his worsening radicular symptoms to the bilateral upper extremities.    Reviewed neurology and PMNR notes.    Reviewed renal function, CBC prior to recommending, initiating, continuing and/or adjusting medications.    Reviewed hemoglobin A1c, renal function, CBC and/or PT/INR prior to discussing/offering interventional modalities.    [place] Prescription Drug Monitoring Program report was reviewed and was appropriate     My impressions and treatment recommendations were discussed in detail with the patient who verbalized understanding and had no further questions.  Discharge instructions were provided. I personally saw and examined the patient and I agree with the above discussed plan of care.    I have spent a total time of 45 minutes in caring for this patient on the day of the visit/encounter including Diagnostic results, Prognosis, Risks and benefits of tx options, Instructions for management, Patient and family education, Impressions, Counseling / Coordination of care, Documenting in the medical record, Reviewing/placing orders in the medical record (including tests, medications, and/or procedures), and Obtaining or reviewing history  .      Orders Placed This Encounter   Procedures    FL spine and pain procedure     Standing Status:   Future     Expected Date:   3/19/2025     Expiration Date:   3/19/2029     Reason for Exam::   bilateral L4 TF GAGE     Anticoagulant hold needed?:   no    FL spine and pain procedure     2 weeks after TF ESIs     Standing Status:   Future     Expected Date:   3/19/2025     Expiration Date:   3/19/2029     Reason for Exam::   T10-T11 thoracic GAGE     Anticoagulant hold needed?:   no    MRI cervical spine wo contrast     Standing Status:   Future     Expected Date:   3/19/2025     Expiration Date:   3/19/2029     Scheduling Instructions:      There is no preparation for this test. Please leave your jewelry and valuables at  "home, wedding rings are the exception. All patients will be required to change into a hospital gown and pants.  Street clothes are not permitted in the MRI.  Magnetic nail polish must be removed prior to arrival for your test. Please bring your insurance cards, a form of photo ID and a list of your medications with you. Arrive 15 minutes prior to your appointment time in order to register. Please bring any prior CT or MRI studies of this area that were not performed at a [facility].            To schedule this appointment, please contact Central Scheduling at [phone].            Prior to your appointment, please make sure you complete the MRI Screening Form when you e-Check in for your appointment. This will be available starting 7 days before your appointment in [employer]. You may receive an e-mail with an activation code if you do not have a [employer] account. If you do not have access to a device, we will complete your screening at your appointment.     Reason for Exam:   cervical radiculopathy     For OP exams needed \"URGENT\", choose the appropriate timeframe below and call Central Scheduling at [phone]. No need to speak with a Radiologist.:   Not URGENT     What is the patient's sedation requirement?:   No Sedation     Does the patient need medication for Claustrophobia? If yes, order medication at this point.:   No     Does the patient wear a life vest, have an implanted cardiac device, a stimulation device, a sleep apnea stimulator, or a breast tissue expansion device?:   No     Release to patient through [employer]:   Immediate     New Medications Ordered This Visit   Medications    gabapentin (Neurontin) 600 MG tablet     Sig: Take 1 tablet (600 mg total) by mouth 3 (three) times a day     Dispense:  90 tablet     Refill:  2       History of Present Illness:  Mu Gruber is a 60 y.o. male who presents for a follow up office visit in regards to Back Pain (MRI discuss).   The patient’s current " symptoms include worsening low back, mid back and neck pain radiating to the legs, ribs and arms. Pain is rated 7/10 and is described as a constant burning, sharp, shooting pain with numbness and pins/needles.    I have personally reviewed and/or updated the patient's past medical history, past surgical history, family history, social history, current medications, allergies, and vital signs today.     Review of Systems   Respiratory:  Negative for shortness of breath.    Cardiovascular:  Negative for chest pain.   Gastrointestinal:  Negative for constipation, diarrhea, nausea and vomiting.   Musculoskeletal:  Positive for arthralgias, back pain and myalgias. Negative for gait problem and joint swelling.   Skin:  Negative for rash.   Neurological:  Positive for numbness. Negative for dizziness, seizures and weakness.   All other systems reviewed and are negative.      Patient Active Problem List   Diagnosis    Left hand paresthesia    Rib pain on left side    Lesion of lung    Smoking       Past Medical History:   Diagnosis Date    Colon polyp     GERD (gastroesophageal reflux disease)     Neuropathy        Past Surgical History:   Procedure Laterality Date    COLONOSCOPY      [place] NEUROPLASTY &/TRANSPOS MEDIAN NRV CARPAL TUNNE Left 1/23/2024    Procedure: CTR, LEFT;  Surgeon: Herrera Smith MD;  Location: [facility];  Service: Orthopedics    [place] NEUROPLASTY &/TRANSPOSITION ULNAR NERVE ELBOW Left 1/23/2024    Procedure: RELEASE CUBITAL TUNNEL, LEFT;  Surgeon: Herrera Smith MD;  Location: [facility];  Service: Orthopedics       Family History   Problem Relation Age of Onset    Cancer Father     Cancer Sister        Social History     Occupational History    Not on file   Tobacco Use    Smoking status: Some Days     Current packs/day: 0.25     Average packs/day: 0.4 packs/day for 20.0 years (7.5 ttl pk-yrs)     Types: Cigarettes    Smokeless tobacco: Never   Vaping Use    Vaping status: Never Used   Substance and Sexual  "Activity    Alcohol use: Yes     Alcohol/week: 12.0 standard drinks of alcohol     Types: 12 Shots of liquor per week     Comment: occasional    Drug use: Yes     Types: Marijuana     Comment: smoking    Sexual activity: Yes     Partners: Male     Birth control/protection: Condom Male       Current Outpatient Medications on File Prior to Visit   Medication Sig    albuterol (ProAir HFA) 90 mcg/act inhaler Inhale 2 puffs every 4 (four) hours as needed for wheezing or shortness of breath    albuterol (Proventil HFA) 90 mcg/act inhaler Inhale 1-2 puffs every 6 (six) hours as needed for wheezing (Patient not taking: Reported on 8/21/2024)    glucosamine-chondroitin 500-400 MG tablet Take 1 tablet by mouth 3 (three) times a day    ibuprofen (MOTRIN) 200 mg tablet Take by mouth every 6 (six) hours as needed for mild pain    lactase (LACTAID) 3,000 units tablet Take 3,000 Units by mouth if needed    multivitamin (THERAGRAN) TABS Take 1 tablet by mouth daily    omeprazole (PriLOSEC OTC) 20 MG tablet Take 20 mg by mouth if needed    Valerian Root 250 MG CAPS Take 250 mg by mouth    [DISCONTINUED] gabapentin (NEURONTIN) 400 mg capsule Take 1 capsule (400 mg total) by mouth 3 (three) times a day     No current facility-administered medications on file prior to visit.       Allergies   Allergen Reactions    Other Other (See Comments)     Pt reports sensitivity to bleach       Physical Exam:    Ht 5' 7\" (1.702 m)   Wt 71.7 kg (158 lb)   BMI 24.75 kg/m²     Constitutional:normal, well developed, well nourished, alert, in no distress and non-toxic and no overt pain behavior.  Eyes:anicteric  HEENT:grossly intact  Neck:supple, symmetric, trachea midline and no masses   Pulmonary:even and unlabored  Cardiovascular:No edema or pitting edema present  Skin:Normal without rashes or lesions and well hydrated  Psychiatric:Mood and affect appropriate  Neurologic: Motor function is grossly intact with no focal neurologic deficits "   Musculoskeletal: nonantalgic gait    Imaging  MRI LUMBAR SPINE WITHOUT CONTRAST     INDICATION: M54.16: Radiculopathy, lumbar region  R20.2: Paresthesia of skin.     COMPARISON: 7/1/2024     TECHNIQUE:  Multiplanar, multisequence imaging of the lumbar spine was performed. .        IMAGE QUALITY:  Diagnostic     FINDINGS:     VERTEBRAL BODIES: There is a transitional lumbosacral junction. Scattered degenerative endplate changes.  No focally suspicious marrow lesions.  No compression abnormality. Normal marrow signal is identified within the visualized bony structures.  No   discrete marrow lesion. Trace grade 1 anterolisthesis of L4 and L5 noted. Probable limbus vertebra superior endplate of L4, anatomic variant.     Type I Modic endplate changes T10-T11.     SACRUM: Scattered degenerative endplate changes.  No focally suspicious marrow lesions.  No bone marrow edema or compression abnormality.     DISTAL CORD AND CONUS:  Normal size and signal within the distal cord and conus. Conus medullaris terminates at the L2-3 level.     PARASPINAL SOFT TISSUES:  Paraspinal soft tissues are unremarkable.     LOWER THORACIC DISC SPACES: T10-T11: There is loss of disc height and signal with a central/left paracentral disc protrusion. Moderate central canal and left subarticular zone narrowing. Mild right narrowing.     T10-T11: There is a central/left paracentral disc herniation, protrusion type. Moderate central canal narrowing. Moderate left neural foraminal narrowing. Moderate moderate right neural foraminal narrowing.     T11-T12: There is loss of disc height and signal.  There is no focal disc herniation, central canal or neural foraminal narrowing.     T12-L1: There is a diffuse disk bulge.  No significant central canal or neural foraminal narrowing.     LUMBAR DISC SPACES:     L1-L2:  Normal.     L2-L3:  Normal.     L3-L4: There is loss of disc height and signal. There is a left neural foraminal disc protrusion. Severe  left neural foraminal narrowing. Central canal patent. Moderate right neural foraminal narrowing.     L4-L5:  There is uncovering the intervertebral disc space. There is bilateral facet hypertrophy. There is a right neural foraminal disc protrusion. Severe right neural foraminal/subarticular recess narrowing. Mild central canal narrowing. Moderate left   neural foraminal narrowing.     L5-S1: There is loss of disc height and signal. There is bilateral facet hypertrophy. There is a right neural foraminal disc protrusion. Mild right neural foraminal narrowing. Central canal patent. Mild left neural foraminal narrowing.     OTHER FINDINGS:  None.     IMPRESSION:        1. Multilevel spondylosis most pronounced at L3-4 and L4-5.  2. There is a transitional vertebral body at the lumbosacral junction.  If spinal intervention is indicated, correlation with radiography recommended.    MRI THORACIC SPINE WITHOUT CONTRAST     INDICATION: M47.14: Other spondylosis with myelopathy, thoracic region.     COMPARISON:  None.     TECHNIQUE:  Multiplanar, multisequence imaging of the thoracic spine was performed. .     IMAGE QUALITY: Diagnostic.     FINDINGS:     ALIGNMENT: There is anterolisthesis of C7 on T1.     MARROW SIGNAL: There are multilevel Modic type I endplate degenerative changes at C7-T1 and T3-T4 through T10-T11. There is otherwise no focal suspicious marrow signal abnormality identified.     THORACIC CORD: Normal signal within the thoracic cord.     PARAVERTEBRAL SOFT TISSUES:  Normal.     THORACIC DEGENERATIVE CHANGE: There is a mild bulge at T3-T4 with mild mass effect on the thecal sac. There is mild to moderate bilateral foraminal narrowing.     There is a mild bulge at T4-T5. There is facet arthrosis. There is mild mass effect on the thecal sac. There is moderate to severe right foraminal narrowing. There is no significant left foraminal narrowing.     There is a bulging annulus with a small superimposed central  disc protrusion at T5-T6. There is mild cord flattening. There is mild to moderate foraminal narrowing.     There is a mild bulge at T6-T7. There is facet arthrosis. There is mild mass effect on thecal sac. There is moderate to severe right and mild left foraminal narrowing.     There is a bulging annulus at T7-T8. There is mild mass effect on the thecal sac. There is mild bilateral foraminal narrowing.     There is a bulging annulus with a superimposed central disc protrusion at T8-T9. There is mild cord indentation. There is no significant foraminal narrowing. There is moderate to severe bilateral foraminal narrowing.     There is a bulging annulus at T9-T10. There is facet arthrosis. There is mild cord flattening. There is mild to moderate bilateral foraminal narrowing.     There is a bulging annulus at T10-T11. There is facet arthrosis. There is mild to moderate canal stenosis. There is severe bilateral foraminal narrowing.     OTHER FINDINGS:  None.     IMPRESSION:     Multilevel degenerative changes of the thoracic spine, as described above.     Most notable level is at T10-T11 where multifactorial disease results in mild to moderate canal stenosis. Severe bilateral foraminal narrowing is present at this level.     Mild cord indentation is present at T8-T9.

## 2025-03-27 ENCOUNTER — HOSPITAL ENCOUNTER (OUTPATIENT)
Dept: RADIOLOGY | Facility: MEDICAL CENTER | Age: 60
End: 2025-03-27
Payer: COMMERCIAL

## 2025-03-27 ENCOUNTER — HOSPITAL ENCOUNTER (OUTPATIENT)
Dept: MRI IMAGING | Facility: HOSPITAL | Age: 60
Discharge: HOME | End: 2025-03-27
Attending: ANESTHESIOLOGY
Payer: COMMERCIAL

## 2025-03-27 VITALS
HEART RATE: 71 BPM | OXYGEN SATURATION: 97 % | SYSTOLIC BLOOD PRESSURE: 135 MMHG | TEMPERATURE: 98.3 F | RESPIRATION RATE: 16 BRPM | DIASTOLIC BLOOD PRESSURE: 90 MMHG

## 2025-03-27 DIAGNOSIS — M54.12 CERVICAL RADICULOPATHY: ICD-10-CM

## 2025-03-27 DIAGNOSIS — M54.16 LUMBAR RADICULOPATHY: ICD-10-CM

## 2025-03-27 PROCEDURE — 64483 NJX AA&/STRD TFRM EPI L/S 1: CPT | Performed by: ANESTHESIOLOGY

## 2025-03-27 PROCEDURE — 72141 MRI NECK SPINE W/O DYE: CPT

## 2025-03-27 RX ORDER — BUPIVACAINE HCL/PF 2.5 MG/ML
3 VIAL (ML) INJECTION ONCE
Status: COMPLETED | OUTPATIENT
Start: 2025-03-27 | End: 2025-03-27

## 2025-03-27 RX ORDER — METHYLPREDNISOLONE ACETATE 40 MG/ML
80 INJECTION, SUSPENSION INTRA-ARTICULAR; INTRALESIONAL; INTRAMUSCULAR; PARENTERAL; SOFT TISSUE ONCE
Status: COMPLETED | OUTPATIENT
Start: 2025-03-27 | End: 2025-03-27

## 2025-03-27 RX ADMIN — BUPIVACAINE HYDROCHLORIDE 3 ML: 2.5 INJECTION, SOLUTION EPIDURAL; INFILTRATION; INTRACAUDAL at 14:03

## 2025-03-27 RX ADMIN — METHYLPREDNISOLONE ACETATE 80 MG: 40 INJECTION, SUSPENSION INTRA-ARTICULAR; INTRALESIONAL; INTRAMUSCULAR; SOFT TISSUE at 14:03

## 2025-03-27 RX ADMIN — IOHEXOL 2 ML: 300 INJECTION, SOLUTION INTRAVENOUS at 14:02

## 2025-03-27 NOTE — DISCHARGE INSTR - LAB
Epidural Steroid Injection   WHAT YOU NEED TO KNOW:   An epidural steroid injection (GAGE) is a procedure to inject steroid medicine into the epidural space. The epidural space is between your spinal cord and vertebrae. Steroids reduce inflammation and fluid buildup in your spine that may be causing pain. You may be given pain medicine along with the steroids.          ACTIVITY  Do not drive or operate machinery today.  No strenuous activity today - bending, lifting, etc.  You may resume normal activites starting tomorrow - start slowly and as tolerated.  You may shower today, but no tub baths or hot tubs.  You may have numbness for several hours from the local anesthetic. Please use caution and common sense, especially with weight-bearing activities.    CARE OF THE INJECTION SITE  If you have soreness or pain, apply ice to the area today (20 minutes on/20 minutes off).  Starting tomorrow, you may use warm, moist heat or ice if needed.  You may have an increase or change in your discomfort for 36-48 hours after your treatment.  Apply ice and continue with any pain medication you have been prescribed.  Notify the [facility] if you have any of the following: redness, drainage, swelling, headache, stiff neck or fever above 100°F.    SPECIAL INSTRUCTIONS  Our office will contact you in approximately 14 days for a progress report.    MEDICATIONS  Continue to take all routine medications.  Our office may have instructed you to hold some medications.    As no general anesthesia was used in today's procedure, you should not experience any side effects related to anesthesia.     If you are diabetic, the steroids used in today's injection may temporarily increase your blood sugar levels after the first few days after your injection. Please keep a close eye on your sugars and alert the doctor who manages your diabetes if your sugars are significantly high from your baseline or you are symptomatic.     If you have a  problem specifically related to your procedure, please call our office at [phone].  Problems not related to your procedure should be directed to your primary care physician.

## 2025-03-27 NOTE — H&P
History of Present Illness: The patient is a 60 y.o. male who presents with complaints of back and leg pain    Past Medical History:   Diagnosis Date    Colon polyp     GERD (gastroesophageal reflux disease)     Neuropathy        Past Surgical History:   Procedure Laterality Date    COLONOSCOPY      [place] NEUROPLASTY &/TRANSPOS MEDIAN NRV CARPAL TUNNE Left 1/23/2024    Procedure: CTR, LEFT;  Surgeon: Herrera Smith MD;  [facility]: [facility];  Service: Orthopedics    [place] NEUROPLASTY &/TRANSPOSITION ULNAR NERVE ELBOW Left 1/23/2024    Procedure: RELEASE CUBITAL TUNNEL, LEFT;  Surgeon: Herrera Smith MD;  Location: [facility];  Service: Orthopedics         Current Outpatient Medications:     albuterol (ProAir HFA) 90 mcg/act inhaler, Inhale 2 puffs every 4 (four) hours as needed for wheezing or shortness of breath, Disp: 8.5 g, Rfl: 5    gabapentin (Neurontin) 600 MG tablet, Take 1 tablet (600 mg total) by mouth 3 (three) times a day, Disp: 90 tablet, Rfl: 2    glucosamine-chondroitin 500-400 MG tablet, Take 1 tablet by mouth 3 (three) times a day, Disp: , Rfl:     ibuprofen (MOTRIN) 200 mg tablet, Take by mouth every 6 (six) hours as needed for mild pain, Disp: , Rfl:     lactase (LACTAID) 3,000 units tablet, Take 3,000 Units by mouth if needed, Disp: , Rfl:     multivitamin (THERAGRAN) TABS, Take 1 tablet by mouth daily, Disp: , Rfl:     omeprazole (PriLOSEC OTC) 20 MG tablet, Take 20 mg by mouth if needed, Disp: , Rfl:     Valerian Root 250 MG CAPS, Take 250 mg by mouth, Disp: , Rfl:     Current Facility-Administered Medications:     bupivacaine (PF) (MARCAINE) 0.25 % injection 3 mL, 3 mL, Epidural, Once, Will Terri Winter MD    iohexol (OMNIPAQUE) 300 mg/mL injection 2 mL, 2 mL, Epidural, Once, Will Terri Winter MD    methylPREDNISolone acetate (DEPO-MEDROL) injection 80 mg, 80 mg, Epidural, Once, Will Terri Winter MD    Allergies   Allergen Reactions    Other Other (See Comments)     Pt reports sensitivity to bleach        Physical Exam:   Vitals:    03/27/25 1348   BP: 134/84   Pulse: 81   Resp: 16   Temp: 98.3 °F (36.8 °C)   SpO2: 94%     General: Awake, Alert, Oriented x 3, Mood and affect appropriate  Respiratory: Respirations even and unlabored  Cardiovascular: Peripheral pulses intact; no edema  Musculoskeletal Exam: back and leg pain    ASA Score: 2    Patient/Chart Verification  Patient ID Verified: Verbal  Consents Confirmed: To be obtained in the Procedural area  H&P( within 30 days) Verified: To be obtained in the Procedural area  Allergies Reviewed: Yes  Anticoag/NSAID held?: NA  Currently on antibiotics?: No  Pregnancy denied?: NA    Assessment:   1. Lumbar radiculopathy        Plan: bilateral L4 TF GAGE

## 2025-04-03 ENCOUNTER — OFFICE VISIT (OUTPATIENT)
Dept: URGENT CARE | Age: 60
End: 2025-04-03
Payer: COMMERCIAL

## 2025-04-03 VITALS
TEMPERATURE: 97.7 F | OXYGEN SATURATION: 99 % | DIASTOLIC BLOOD PRESSURE: 78 MMHG | RESPIRATION RATE: 18 BRPM | SYSTOLIC BLOOD PRESSURE: 136 MMHG | HEART RATE: 59 BPM

## 2025-04-03 DIAGNOSIS — R05.1 ACUTE COUGH: Primary | ICD-10-CM

## 2025-04-03 LAB
SARS-COV-2 AG UPPER RESP QL IA: NEGATIVE
VALID CONTROL: ABNORMAL

## 2025-04-03 PROCEDURE — 87811 SARS-COV-2 COVID19 W/OPTIC: CPT

## 2025-04-03 PROCEDURE — 99213 OFFICE O/P EST LOW 20 MIN: CPT

## 2025-04-03 NOTE — LETTER
April 3, 2025     Patient: Mu Gruber   YOB: 1965   Date of Visit: 4/3/2025       To Whom It May Concern:    It is my medical opinion that Mu Gruber may return to work on 04/04/2025 .    If you have any questions or concerns, please don't hesitate to call.         Sincerely,        FIFI Fish    CC: No Recipients

## 2025-04-03 NOTE — PROGRESS NOTES
[facility]        NAME: Mu Gruber is a 60 y.o. male  : 1965    MRN: [id number]  DATE: April 3, 2025  TIME: 3:39 PM    Assessment and Plan   Acute cough [R05.1]  1. Acute cough  Poct Covid 19 Rapid Antigen Test            Patient Instructions   Saline nasal spray as often as needed  Flonase nasal spray 1 spray to each nostril  You can take Mucinex in the blue box for congestion as directed    Increase your fluids and rest    Tylenol or Motrin for fever or discomfort    COVID testing today was negative    Follow up with PCP in 3-5 days.  Proceed to  ER if symptoms worsen.    If tests have been performed at [facility], our office will contact you with results if changes need to be made to the care plan discussed with you at the visit.  You can review your full results on [facility].    Chief Complaint     Chief Complaint   Patient presents with    Fatigue     Onset 4/2 Pt states his body is sore, sneezing, productive cough, fatigue          History of Present Illness       This is a 60-year-old male who presents today with onset of symptoms yesterday morning when he came home from work.  He does work night shift for [employer].  He complains of bodyaches cough and fatigue.  He states that he does have nasal congestion postnasal drip and a cough.  He denies any ear pain or sore throat.  Lungs are clear on room air with a sat of 99%.  He denies any shortness of breath.  He states that he had the flu about a month ago.  He denies any fever at home.  He states everybody else at work is sick.  Rapid covid test in the office was negative            Review of Systems   Review of Systems   Constitutional:  Positive for chills and fatigue. Negative for fever.   HENT:  Positive for congestion and postnasal drip. Negative for ear pain, sinus pressure, sinus pain and sore throat.    Respiratory:  Positive for cough. Negative for shortness of breath.    Cardiovascular: Negative.    Gastrointestinal: Negative.     Genitourinary: Negative.    Musculoskeletal:  Positive for myalgias.   Neurological: Negative.  Negative for headaches.         Current Medications       Current Outpatient Medications:     albuterol (ProAir HFA) 90 mcg/act inhaler, Inhale 2 puffs every 4 (four) hours as needed for wheezing or shortness of breath, Disp: 8.5 g, Rfl: 5    gabapentin (Neurontin) 600 MG tablet, Take 1 tablet (600 mg total) by mouth 3 (three) times a day, Disp: 90 tablet, Rfl: 2    glucosamine-chondroitin 500-400 MG tablet, Take 1 tablet by mouth 3 (three) times a day, Disp: , Rfl:     ibuprofen (MOTRIN) 200 mg tablet, Take by mouth every 6 (six) hours as needed for mild pain, Disp: , Rfl:     lactase (LACTAID) 3,000 units tablet, Take 3,000 Units by mouth if needed, Disp: , Rfl:     multivitamin (THERAGRAN) TABS, Take 1 tablet by mouth daily, Disp: , Rfl:     omeprazole (PriLOSEC OTC) 20 MG tablet, Take 20 mg by mouth if needed, Disp: , Rfl:     Valerian Root 250 MG CAPS, Take 250 mg by mouth, Disp: , Rfl:     Current Allergies     Allergies as of 04/03/2025 - Reviewed 04/03/2025   Allergen Reaction Noted    Other Other (See Comments) 08/01/2024            The following portions of the patient's history were reviewed and updated as appropriate: allergies, current medications, past family history, past medical history, past social history, past surgical history and problem list.     Past Medical History:   Diagnosis Date    Colon polyp     GERD (gastroesophageal reflux disease)     Neuropathy        Past Surgical History:   Procedure Laterality Date    COLONOSCOPY      [place] NEUROPLASTY &/TRANSPOS MEDIAN NRV CARPAL TUNNE Left 1/23/2024    Procedure: CTR, LEFT;  Surgeon: Herrera Smith MD;  Location: [facility];  Service: Orthopedics    [place] NEUROPLASTY &/TRANSPOSITION ULNAR NERVE ELBOW Left 1/23/2024    Procedure: RELEASE CUBITAL TUNNEL, LEFT;  Surgeon: Herrera Smith MD;  Location: [facility];  Service: Orthopedics       Family History   Problem  Relation Age of Onset    Cancer Father     Cancer Sister          Medications have been verified.        Objective   /78   Pulse 59   Temp 97.7 °F (36.5 °C)   Resp 18   SpO2 99%   No LMP for male patient.       Physical Exam     Physical Exam  Vitals reviewed.   Constitutional:       General: He is not in acute distress.     Appearance: Normal appearance. He is not ill-appearing.   HENT:      Head: Normocephalic and atraumatic.      Right Ear: Tympanic membrane, ear canal and external ear normal.      Left Ear: Tympanic membrane, ear canal and external ear normal.      Nose: Congestion present.      Mouth/Throat:      Mouth: Mucous membranes are moist.      Pharynx: No posterior oropharyngeal erythema.   Eyes:      Extraocular Movements: Extraocular movements intact.      Conjunctiva/sclera: Conjunctivae normal.   Cardiovascular:      Rate and Rhythm: Normal rate and regular rhythm.      Pulses: Normal pulses.      Heart sounds: Normal heart sounds.   Pulmonary:      Effort: Pulmonary effort is normal. No respiratory distress.      Breath sounds: No stridor. No wheezing, rhonchi or rales.   Abdominal:      General: Abdomen is flat. Bowel sounds are normal.   Musculoskeletal:         General: Normal range of motion.      Cervical back: Normal range of motion and neck supple.   Lymphadenopathy:      Cervical: No cervical adenopathy.   Skin:     General: Skin is warm.   Neurological:      General: No focal deficit present.      Mental Status: He is alert.   Psychiatric:         Mood and Affect: Mood normal.         Behavior: Behavior normal.         Judgment: Judgment normal.

## 2025-04-03 NOTE — PATIENT INSTRUCTIONS
Saline nasal spray as often as needed  Flonase nasal spray 1 spray to each nostril  You can take Mucinex in the blue box for congestion as directed    Increase your fluids and rest    Tylenol or Motrin for fever or discomfort    COVID testing today was negative

## 2025-04-10 ENCOUNTER — HOSPITAL ENCOUNTER (OUTPATIENT)
Dept: RADIOLOGY | Facility: MEDICAL CENTER | Age: 60
End: 2025-04-10
Payer: COMMERCIAL

## 2025-04-10 ENCOUNTER — TELEPHONE (OUTPATIENT)
Dept: PAIN MEDICINE | Facility: CLINIC | Age: 60
End: 2025-04-10

## 2025-04-10 VITALS
HEART RATE: 84 BPM | RESPIRATION RATE: 20 BRPM | DIASTOLIC BLOOD PRESSURE: 98 MMHG | TEMPERATURE: 97.4 F | SYSTOLIC BLOOD PRESSURE: 152 MMHG | OXYGEN SATURATION: 97 %

## 2025-04-10 DIAGNOSIS — M54.14 THORACIC RADICULITIS: ICD-10-CM

## 2025-04-10 PROCEDURE — 62321 NJX INTERLAMINAR CRV/THRC: CPT | Performed by: ANESTHESIOLOGY

## 2025-04-10 RX ORDER — BUPIVACAINE HCL/PF 2.5 MG/ML
1 VIAL (ML) INJECTION ONCE
Status: DISCONTINUED | OUTPATIENT
Start: 2025-04-10 | End: 2025-04-10

## 2025-04-10 RX ORDER — METHYLPREDNISOLONE ACETATE 80 MG/ML
80 INJECTION, SUSPENSION INTRA-ARTICULAR; INTRALESIONAL; INTRAMUSCULAR; PARENTERAL; SOFT TISSUE ONCE
Status: COMPLETED | OUTPATIENT
Start: 2025-04-10 | End: 2025-04-10

## 2025-04-10 RX ADMIN — IOHEXOL 1 ML: 300 INJECTION, SOLUTION INTRAVENOUS at 08:46

## 2025-04-10 RX ADMIN — METHYLPREDNISOLONE ACETATE 80 MG: 80 INJECTION, SUSPENSION INTRA-ARTICULAR; INTRALESIONAL; INTRAMUSCULAR; SOFT TISSUE at 08:46

## 2025-04-10 NOTE — DISCHARGE INSTR - LAB
Epidural Steroid Injection   WHAT YOU NEED TO KNOW:   An epidural steroid injection (GAGE) is a procedure to inject steroid medicine into the epidural space. The epidural space is between your spinal cord and vertebrae. Steroids reduce inflammation and fluid buildup in your spine that may be causing pain. You may be given pain medicine along with the steroids.          ACTIVITY  Do not drive or operate machinery today.  No strenuous activity today - bending, lifting, etc.  You may resume normal activites starting tomorrow - start slowly and as tolerated.  You may shower today, but no tub baths or hot tubs.  You may have numbness for several hours from the local anesthetic. Please use caution and common sense, especially with weight-bearing activities.    CARE OF THE INJECTION SITE  If you have soreness or pain, apply ice to the area today (20 minutes on/20 minutes off).  Starting tomorrow, you may use warm, moist heat or ice if needed.  You may have an increase or change in your discomfort for 36-48 hours after your treatment.  Apply ice and continue with any pain medication you have been prescribed.  Notify the [facility] if you have any of the following: redness, drainage, swelling, headache, stiff neck or fever above 100°F.    SPECIAL INSTRUCTIONS  Our office will contact you in approximately 14 days for a progress report.    MEDICATIONS  Continue to take all routine medications.  Our office may have instructed you to hold some medications.You may resume your Ibuprofen in 24 hours, so tomorrow after 9:15 am.     As no general anesthesia was used in today's procedure, you should not experience any side effects related to anesthesia.     If you are diabetic, the steroids used in today's injection may temporarily increase your blood sugar levels after the first few days after your injection. Please keep a close eye on your sugars and alert the doctor who manages your diabetes if your sugars are  significantly high from your baseline or you are symptomatic.     If you have a problem specifically related to your procedure, please call our office at [phone].  Problems not related to your procedure should be directed to your primary care physician.

## 2025-04-10 NOTE — H&P
History of Present Illness: The patient is a 60 y.o. male who presents with complaints of back pain    Past Medical History:   Diagnosis Date    Colon polyp     GERD (gastroesophageal reflux disease)     Neuropathy        Past Surgical History:   Procedure Laterality Date    COLONOSCOPY      [place] NEUROPLASTY &/TRANSPOS MEDIAN NRV CARPAL TUNNE Left 1/23/2024    Procedure: CTR, LEFT;  Surgeon: Herrera Smith MD;  Location: [facility];  Service: Orthopedics    [place] NEUROPLASTY &/TRANSPOSITION ULNAR NERVE ELBOW Left 1/23/2024    Procedure: RELEASE CUBITAL TUNNEL, LEFT;  Surgeon: Herrera Smith MD;  Location: [facility];  Service: Orthopedics         Current Outpatient Medications:     gabapentin (Neurontin) 600 MG tablet, Take 1 tablet (600 mg total) by mouth 3 (three) times a day, Disp: 90 tablet, Rfl: 2    glucosamine-chondroitin 500-400 MG tablet, Take 1 tablet by mouth 3 (three) times a day, Disp: , Rfl:     ibuprofen (MOTRIN) 200 mg tablet, Take by mouth every 6 (six) hours as needed for mild pain, Disp: , Rfl:     lactase (LACTAID) 3,000 units tablet, Take 3,000 Units by mouth if needed, Disp: , Rfl:     multivitamin (THERAGRAN) TABS, Take 1 tablet by mouth daily, Disp: , Rfl:     omeprazole (PriLOSEC OTC) 20 MG tablet, Take 20 mg by mouth if needed, Disp: , Rfl:     Valerian Root 250 MG CAPS, Take 250 mg by mouth, Disp: , Rfl:     Current Facility-Administered Medications:     bupivacaine (PF) (MARCAINE) 0.25 % injection 1 mL, 1 mL, Epidural, Once, Will Terri Winter MD    iohexol (OMNIPAQUE) 300 mg/mL injection 1 mL, 1 mL, Epidural, Once, Will Terri Winter MD    methylPREDNISolone acetate (DEPO-MEDROL) injection 80 mg, 80 mg, Epidural, Once, Will Terri Winter MD    Allergies   Allergen Reactions    Other Other (See Comments)     Pt reports sensitivity to bleach       Physical Exam:   Vitals:    04/10/25 0831   BP: 153/99   Pulse: 85   Resp: 18   Temp: (!) 97.4 °F (36.3 °C)   SpO2: 98%     General: Awake, Alert, Oriented x  3, Mood and affect appropriate  Respiratory: Respirations even and unlabored  Cardiovascular: Peripheral pulses intact; no edema  Musculoskeletal Exam: back pain    ASA Score: 2    Patient/Chart Verification  Patient ID Verified: Verbal  ID Band Applied: No  Consents Confirmed: To be obtained in the Procedural area  Interval H&P(within 24 hr) Complete (required for Outpatients and Surgery Admit only): To be obtained in the Procedural area  Allergies Reviewed: Yes  Anticoag/NSAID held?: Yes (LD of Ibuprofen was 4/8)  Currently on antibiotics?: No    Assessment:   1. Thoracic radiculitis        Plan: T10-T11 thoracic GAGE

## 2025-04-24 ENCOUNTER — TELEPHONE (OUTPATIENT)
Dept: PAIN MEDICINE | Facility: CLINIC | Age: 60
End: 2025-04-24

## 2025-08-22 DIAGNOSIS — M54.14 THORACIC RADICULITIS: ICD-10-CM

## 2025-08-22 DIAGNOSIS — M54.16 LUMBAR RADICULOPATHY: ICD-10-CM

## 2025-08-22 DIAGNOSIS — M54.12 CERVICAL RADICULOPATHY: ICD-10-CM

## 2025-08-22 RX ORDER — GABAPENTIN 600 MG/1
600 TABLET ORAL 3 TIMES DAILY
Qty: 90 TABLET | Refills: 3 | Status: SHIPPED | OUTPATIENT
Start: 2025-08-22

## (undated) DEVICE — ACE WRAP 4 IN UNSTERILE

## (undated) DEVICE — CUFF TOURNIQUET 18 X 4 IN QUICK CONNECT DISP 1 BLADDER

## (undated) DEVICE — PADDING CAST 4 IN  COTTON STRL

## (undated) DEVICE — GLOVE SRG BIOGEL 7.5

## (undated) DEVICE — SUT ETHILON 4-0 PS-2 18 IN 1667H

## (undated) DEVICE — NEEDLE HYPO 22G X 1-1/2 IN

## (undated) DEVICE — GLOVE INDICATOR PI UNDERGLOVE SZ 8 BLUE

## (undated) DEVICE — STERILE BETHLEHEM PLASTIC HAND: Brand: CARDINAL HEALTH

## (undated) DEVICE — CAST PADDING 4 IN SYNTHETIC NON-STRL

## (undated) DEVICE — ACE WRAP 3 IN UNSTERILE

## (undated) DEVICE — SCD SEQUENTIAL COMPRESSION COMFORT SLEEVE MEDIUM KNEE LENGTH: Brand: KENDALL SCD

## (undated) DEVICE — 3M™ STERI-STRIP™ REINFORCED ADHESIVE SKIN CLOSURES, R1547, 1/2 IN X 4 IN (12 MM X 100 MM), 6 STRIPS/ENVELOPE: Brand: 3M™ STERI-STRIP™

## (undated) DEVICE — PAD CAST 4 IN COTTON NON STERILE

## (undated) DEVICE — INTENDED FOR TISSUE SEPARATION, AND OTHER PROCEDURES THAT REQUIRE A SHARP SURGICAL BLADE TO PUNCTURE OR CUT.: Brand: BARD-PARKER ® CARBON RIB-BACK BLADES

## (undated) DEVICE — NEEDLE 18 G X 1 1/2

## (undated) DEVICE — GLOVE INDICATOR PI UNDERGLOVE SZ 7.5 BLUE

## (undated) DEVICE — SUT VICRYL 3-0 PS-2 27 IN J427H

## (undated) DEVICE — ADHESIVE SKIN HIGH VISCOSITY EXOFIN 1ML

## (undated) DEVICE — SYRINGE 10ML LL